# Patient Record
Sex: FEMALE | Race: WHITE | NOT HISPANIC OR LATINO | Employment: OTHER | URBAN - METROPOLITAN AREA
[De-identification: names, ages, dates, MRNs, and addresses within clinical notes are randomized per-mention and may not be internally consistent; named-entity substitution may affect disease eponyms.]

---

## 2017-01-03 ENCOUNTER — OFFICE VISIT (OUTPATIENT)
Dept: SPORTS MEDICINE | Facility: CLINIC | Age: 82
End: 2017-01-03
Payer: MEDICARE

## 2017-01-03 VITALS — WEIGHT: 187 LBS | HEIGHT: 62 IN | TEMPERATURE: 98 F | BODY MASS INDEX: 34.41 KG/M2

## 2017-01-03 DIAGNOSIS — M17.11 PRIMARY OSTEOARTHRITIS OF RIGHT KNEE: Primary | ICD-10-CM

## 2017-01-03 PROCEDURE — 99214 OFFICE O/P EST MOD 30 MIN: CPT | Mod: S$PBB,,, | Performed by: FAMILY MEDICINE

## 2017-01-03 PROCEDURE — 99213 OFFICE O/P EST LOW 20 MIN: CPT | Mod: PBBFAC,PO | Performed by: FAMILY MEDICINE

## 2017-01-03 PROCEDURE — 99999 PR PBB SHADOW E&M-EST. PATIENT-LVL III: CPT | Mod: PBBFAC,,, | Performed by: FAMILY MEDICINE

## 2017-01-03 NOTE — MR AVS SNAPSHOT
[facility]  [address]  [facility] [address]  Phone: [phone]                  Jodie Mehta   1/3/2017 2:15 PM   Appointment    Description:  Female : 1931   Provider:  Kavon Villagran MD   Department:  [facility]                To Do List           Future Appointments        Provider Department Dept Phone    1/3/2017 2:15 PM Kavon Villagran MD [facility] [phone]      Goals (5 Years of Data)     None      [facility]     [facility] Care [facility] -  Assistance  Registered nurses in the [facility] On Call Center provide clinical advisement, health education, appointment booking, and other advisory services.  Call for this free service at [phone].             Medications           Message regarding Medications     Verify the changes and/or additions to your medication regime listed below are the same as discussed with your clinician today.  If any of these changes or additions are incorrect, please notify your healthcare provider.             Verify that the below list of medications is an accurate representation of the medications you are currently taking.  If none reported, the list may be blank. If incorrect, please contact your healthcare provider. Carry this list with you in case of emergency.           Current Medications     aspirin (ECOTRIN) 81 MG EC tablet Take 81 mg by mouth once daily.      azelastine (ASTELIN) 137 mcg (0.1 %) nasal spray     budesonide-formoterol 160-4.5 mcg (SYMBICORT) 160-4.5 mcg/actuation HFAA Inhale 2 puffs into the lungs every 12 (twelve) hours.      calcium carbonate-vit D3-min (CALTRATE 600+D PLUS MINERALS) 600 mg (1,500 mg)-400 unit Chew Take by mouth. 1 Tablet, Chewable Oral Every day    calcium citrate-vitamin D3 315-200 mg (CITRACAL+D) 315-200 mg-unit per tablet Take 1 tablet by mouth 2 (two) times daily.      chlorzoxazone (PARAFON FORTE) 500 mg Tab Take 500  mg by mouth 4 (four) times daily as needed.     cyclobenzaprine (FLEXERIL) 5 MG tablet Take 1 tablet (5 mg total) by mouth nightly.    dexlansoprazole (DEXILANT) 60 mg capsule Take 60 mg by mouth once daily.     donepezil (ARICEPT) 10 MG tablet Take 10 mg by mouth every evening.      hydrochlorothiazide (HYDRODIURIL) 25 MG tablet Take by mouth. 1 Tablet Oral Every morning    memantine (NAMENDA) 10 MG Tab Take 5 mg by mouth once daily.      naproxen (EC NAPROSYN) 500 MG EC tablet Take by mouth. 1 Tablet, Delayed Release (E.C.) Oral Twice a day    potassium chloride (MICRO-K) 10 MEQ CpSR Take 10 mEq by mouth once daily.      solifenacin (VESICARE) 5 MG tablet Take 10 mg by mouth once daily.      telmisartan (MICARDIS) 80 MG Tab Take 40 mg by mouth once daily.      telmisartan-hydrochlorothiazide (MICARDIS HCT) 40-12.5 mg per tablet Take by mouth. 1 Tablet Oral Every day           Clinical Reference Information           Allergies as of 1/3/2017     Sulfa (Sulfonamide Antibiotics)      Immunizations Administered on Date of Encounter - 1/3/2017     None      [facility] Sign-Up     Activating your [facility] account is as easy as 1-2-3!     1) Visit [email or web address], select Sign Up Now, enter this activation code and your date of birth, then select Next.  [email or web address]  Expires: 1/26/2017  1:55 PM      2) Create a username and password to use when you visit [facility] in the future and select a security question in case you lose your password and select Next.    3) Enter your e-mail address and click Sign Up!    Additional Information  If you have questions, please e-mail [email or web address] or call [phone] to talk to our [facility] staff. Remember, [facility] is NOT to be used for urgent needs. For medical emergencies, dial 911.

## 2017-01-03 NOTE — PROGRESS NOTES
Subjective:          Chief Complaint: Jodie Mehta is a 85 y.o. female who had concerns including Pain of the Right Knee.    HPI  #1  Location: anterior knee, right   Onset: insidious, many years  Palliative:    Relative rest   VSI, in past, most recent 16.08, little improvement    CSI, in past, little improvement    Walker   Wheelchair   Provocative:    Ambulation   ADLs  Prior:    L knee - TKA - ~1994   Progression: worsening discomfort  Quality:    Sharp pain with activity    Ache   Radiation: none  Severity: per nursing documentation  Timing: intermittent w/ use  Trauma: none    ROS  Review of systems (ROS):  A 10+ review of systems was performed with pertinent positives and negatives noted above in the history of present illness. Other systems were negative unless otherwise specified.    Pain Related Questions  Over the past 3 days, what was your average pain during activity? (I.e. running, jogging, walking, climbing stairs, getting dressed, ect.): 8  Over the past 3 days, what was your highest pain level?: 8  Over the past 3 days, what was your lowest pain level? : 2    Other  How many nights a week are you awakened by your affected body part?: 0  Was the patient's HEIGHT measured or patient reported?: Patient Reported  Was the patient's WEIGHT measured or patient reported?: Patient Reported      Objective:        General: Jodie is well-developed, well-nourished, appears stated age, in no acute distress, alert and oriented to time, place and person.     Ortho/SPM Exam  Constitutional:     -Vital signs: height, weight, and temperature: reviewed     -General appearance: no apparent distress  Dermatologic/skin:     -Inspection: No acute lesions, ulceration, or induration of the skin noted about   the area evaluated   -Palpation: No subcutaneous crepitus noted about the area evaluated  Musculoskeletal:   Observation: There is no edema, erythema, or ecchymoses about the knee  Gait: ANTALGIC  Standing  alignment: No pathologic varus or valgus deformity  Feet: No pathologic pes planus or pes cavus   Squatting: unable to perform     No recurvatum of LE noted  No joint effusion noted  POS tenderness with lateral or medial patellar mobility  No tenderness with palpation of lateral or medial patellar facet joints  No tenderness with palpation of distal quadriceps  No tenderness with palpation of quadriceps tendon   POS tenderness with palpation of patellar tendon  No tenderness with palpation of popliteal fossa, no popliteal cyst noted  No pain or laxity with passive varus stressing of the knee  No pain or laxity with passive valgus stressing of the knee  Lachman's test has firm endpoint  Passive knee flexion DOES elicit pain past 110 degrees  POS tenderness with palpation of tibial tuberosity, anserine bursa, or Gerdy's tubercle  POS tenderness with medial joint line palpation  No tenderness with palpation of medial collateral ligament (MCL)  POS tenderness with lateral joint line palpation  No tenderness with palpation of lateral collateral ligament (LCL)  Posterior drawer test has firm endpoint  Ana's test DOES elicit pain, though no clunk    Hip examination:  ROM appropriate.  No pain elicited with passive ROM.        Cardiovascular:   -Examined extremity is warm and well perfused   Neurologic:   -Examined extremitys sensation is appropriate     AAFP/FPM: Pocket Guide Documentation Guidelines, (c)2014    (80619=2+ systems/areas or 12+ bullets (8 MSK)   04855=6+ systems/areas or 18+ bullets (12 MSK))        Assessment:       Encounter Diagnosis   Name Primary?   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, yoel med compartment, right   S/p L knee TKA ~ 1994  #2 Alzheimer's dementia    No evidence of neurologic pathology  No evidence of vascular pathology    Imaging studies reviewed:   X-ray knee bilateral 16.08    Plan:    We  discussed the importance of appropriate diet, weight, and regular exercise including quadriceps strengthening     We discussed options including:  #1 watchful waiting  #2 physical therapy aimed at:   quadriceps strengthening   gait training   #3 injection therapy:   CSI iaknee    Right, worked poorly, repeat   VSI iaknee    Right, worked poorly, repeat   Orthobiologics     PRP    SCI      Question of ability to lie still for SC harvest  #4 consultation re: TKA   Poor surgical candidate      The patient and her  chooses #3 PRP iaknee right    Pain management: handout given  Bracing:    Wheelchair, prn  Physical therapy:   Activity (e.g. sports, work) restrictions: as tolerated   school/vocation:     Follow up for PRP procedure  Ineffective-->BMAC  Should symptoms worsen or fail to resolve, consider:  Revisiting the above options      Patient questionnaires may have been collected.

## 2017-01-05 ENCOUNTER — OFFICE VISIT (OUTPATIENT)
Dept: SPORTS MEDICINE | Facility: CLINIC | Age: 82
End: 2017-01-05

## 2017-01-05 VITALS — BODY MASS INDEX: 34.41 KG/M2 | WEIGHT: 187 LBS | TEMPERATURE: 98 F | HEIGHT: 62 IN

## 2017-01-05 DIAGNOSIS — M25.561 CHRONIC PAIN OF RIGHT KNEE: Primary | ICD-10-CM

## 2017-01-05 DIAGNOSIS — M17.11 PRIMARY OSTEOARTHRITIS OF RIGHT KNEE: ICD-10-CM

## 2017-01-05 DIAGNOSIS — G89.29 CHRONIC PAIN OF RIGHT KNEE: Primary | ICD-10-CM

## 2017-01-05 PROCEDURE — 0232T NJX PLATELET PLASMA: CPT | Mod: S$GLB,,, | Performed by: FAMILY MEDICINE

## 2017-01-05 PROCEDURE — 99999 PR PBB SHADOW E&M-EST. PATIENT-LVL III: CPT | Mod: PBBFAC,,, | Performed by: FAMILY MEDICINE

## 2017-01-05 PROCEDURE — 99499 UNLISTED E&M SERVICE: CPT | Mod: CSM,S$GLB,, | Performed by: FAMILY MEDICINE

## 2017-01-05 NOTE — MR AVS SNAPSHOT
[facility]  [address]  [facility] [address]  Phone: [phone]                  Jodie Mehta   2017 1:30 PM   Appointment    Description:  Female : 1931   Provider:  Kavon Villagran MD   Department:  [facility]                To Do List           Future Appointments        Provider Department Dept Phone    2017 1:30 PM Kavon Vilalgran MD [facility] [phone]      Goals (5 Years of Data)     None      [facility]     [facility] Nurse Care Line -  Assistance  Registered nurses in the [facility] On Call Center provide clinical advisement, health education, appointment booking, and other advisory services.  Call for this free service at [phone].             Medications           Message regarding Medications     Verify the changes and/or additions to your medication regime listed below are the same as discussed with your clinician today.  If any of these changes or additions are incorrect, please notify your healthcare provider.             Verify that the below list of medications is an accurate representation of the medications you are currently taking.  If none reported, the list may be blank. If incorrect, please contact your healthcare provider. Carry this list with you in case of emergency.           Current Medications     aspirin (ECOTRIN) 81 MG EC tablet Take 81 mg by mouth once daily.      azelastine (ASTELIN) 137 mcg (0.1 %) nasal spray     budesonide-formoterol 160-4.5 mcg (SYMBICORT) 160-4.5 mcg/actuation HFAA Inhale 2 puffs into the lungs every 12 (twelve) hours.      calcium carbonate-vit D3-min (CALTRATE 600+D PLUS MINERALS) 600 mg (1,500 mg)-400 unit Chew Take by mouth. 1 Tablet, Chewable Oral Every day    calcium citrate-vitamin D3 315-200 mg (CITRACAL+D) 315-200 mg-unit per tablet Take 1 tablet by mouth 2 (two) times daily.      chlorzoxazone (PARAFON FORTE) 500 mg Tab Take 500  mg by mouth 4 (four) times daily as needed.     cyclobenzaprine (FLEXERIL) 5 MG tablet Take 1 tablet (5 mg total) by mouth nightly.    dexlansoprazole (DEXILANT) 60 mg capsule Take 60 mg by mouth once daily.     donepezil (ARICEPT) 10 MG tablet Take 10 mg by mouth every evening.      hydrochlorothiazide (HYDRODIURIL) 25 MG tablet Take by mouth. 1 Tablet Oral Every morning    memantine (NAMENDA) 10 MG Tab Take 5 mg by mouth once daily.      naproxen (EC NAPROSYN) 500 MG EC tablet Take by mouth. 1 Tablet, Delayed Release (E.C.) Oral Twice a day    potassium chloride (MICRO-K) 10 MEQ CpSR Take 10 mEq by mouth once daily.      solifenacin (VESICARE) 5 MG tablet Take 10 mg by mouth once daily.      telmisartan (MICARDIS) 80 MG Tab Take 40 mg by mouth once daily.      telmisartan-hydrochlorothiazide (MICARDIS HCT) 40-12.5 mg per tablet Take by mouth. 1 Tablet Oral Every day           Clinical Reference Information           Allergies as of 1/5/2017     Sulfa (Sulfonamide Antibiotics)      Immunizations Administered on Date of Encounter - 1/5/2017     None      [facility] Sign-Up     Activating your [facility] account is as easy as 1-2-3!     1) Visit [email or web address], select Sign Up Now, enter this activation code and your date of birth, then select Next.  [email or web address]  Expires: 1/26/2017  1:55 PM      2) Create a username and password to use when you visit [facility] in the future and select a security question in case you lose your password and select Next.    3) Enter your e-mail address and click Sign Up!    Additional Information  If you have questions, please e-mail [email or web address] or call [phone] to talk to our [facility] staff. Remember, [facility] is NOT to be used for urgent needs. For medical emergencies, dial 911.

## 2017-01-09 NOTE — PROGRESS NOTES
Platelet rich plasma (PRP) injection under ultrasound guidance  0232T     Name of procedure:    ultrasound-guided injection of platelet rich plasma (PRP), intra articular knee joint  Indication(s):    pain, osteoarthritis of knee, medial and lateral meniscal tearing, knee instability  Patient consent:    The indications, risks, and alternatives to the procedure were explained to the patient.  The  patient was given the opportunity to ask questions.  The the patient consented to the  procedure.    Type of anesthesia used: local   lidocaine: no   cold spray: yes    Description of injection procedure:    The procedure used standard sterile preparation, local anesthesia, and patient positioning.  A surgical time out was performed, including verification of patient ID, procedure, site and side, availability of information and equipment, review of safety issues, and agreement with consent.  The exact position of the area to be injected was identified using 1) physical examination and 2) ultrasound imaging.  The injection site was marked and then cleaned using aseptic technique.  Under ultrasound guidance, the needle was advanced to the site to be injected, and the injectate was deposited at the specific site.    [Location:   Site:   Needle size:   Approach: ]  Sterile technique was used to extract 54 mL of blood from a peripheral vein.  This blood was  into density-divided layers using an Drone[employer] Kingsley centrifuge.  The layer of platelet rich plasma (PRP) was placed into a sterile syringe.  Using sterile technique, a therapeutic dose of PRP was injected into the supero lateral aspect of the suprapatellar bursa of the knee using a 20g needle.  Complications: none  Estimated blood loss from injection procedure: none  Disposition: The patient tolerated the procedure well and there were no immediate complications or adverse reactions to the injectate.  The patient was instructed to call the clinic immediately for any  mild to moderate adverse side effects, or to call 911 in the event of an emergency.    Homegoing care: In case of injection-site soreness, the patient was instructed to apply ice to the joint in 20 minutes on/20 minutes off intervals.  Strenuous activities should be avoided for 24 hours following the injection.  The patient was warned of possible blood sugar and/or blood pressure changes after the procedure.  Using the rate of pain resolution as a guide, the patient can resume use of the injected extremity as tolerated.        Description of ultrasound utilization for needle guidance:   Ultrasound guidance used for needle localization.  Images saved and stored for documentation.  The medial and lateral knee compartments were visualized with significant degenerative and inflammatory changes.  Dynamic visualization of the 20g x 1.5 needle was continuous throughout the procedure.    References:  US-guided injection template of LATESHA Funk M.D. from 11/18/15  US-guided injection EMR (likely Epic-based) template of Zack Michael from 11/11/15  Epic-based, quick procedure note of AWG-performed injection on 11/23/15

## 2017-01-23 ENCOUNTER — TELEPHONE (OUTPATIENT)
Dept: SPORTS MEDICINE | Facility: CLINIC | Age: 82
End: 2017-01-23

## 2017-01-23 NOTE — TELEPHONE ENCOUNTER
----- Message from Elicia Townsend sent at 1/23/2017  1:05 PM CST -----  Contact: self@ home   pt is calling to schedule a appt for the F/U to a steam cell appt.

## 2017-01-27 ENCOUNTER — OFFICE VISIT (OUTPATIENT)
Dept: SPORTS MEDICINE | Facility: CLINIC | Age: 82
End: 2017-01-27
Payer: MEDICARE

## 2017-01-27 VITALS — BODY MASS INDEX: 34.41 KG/M2 | TEMPERATURE: 98 F | WEIGHT: 187 LBS | HEIGHT: 62 IN

## 2017-01-27 DIAGNOSIS — M17.11 PRIMARY OSTEOARTHRITIS OF RIGHT KNEE: Primary | ICD-10-CM

## 2017-01-27 PROCEDURE — 99999 PR PBB SHADOW E&M-EST. PATIENT-LVL III: CPT | Mod: PBBFAC,,, | Performed by: FAMILY MEDICINE

## 2017-01-27 PROCEDURE — 99213 OFFICE O/P EST LOW 20 MIN: CPT | Mod: PBBFAC,PO | Performed by: FAMILY MEDICINE

## 2017-01-27 PROCEDURE — 99214 OFFICE O/P EST MOD 30 MIN: CPT | Mod: S$PBB,,, | Performed by: FAMILY MEDICINE

## 2017-01-27 NOTE — MR AVS SNAPSHOT
[facility]  [address]  [facility] [address]  Phone: [phone]                  Jodie Mehta   2017 12:45 PM   Appointment    Description:  Female : 1931   Provider:  Kavon Villagran MD   Department:  [facility]                To Do List           Future Appointments        Provider Department Dept Phone    2017 12:45 PM Kavon Villagran MD [facility] [phone]      Goals (5 Years of Data)     None      [facility]     [facility] Nurse Care Line -  Assistance  Registered nurses in the [facility] On Call Center provide clinical advisement, health education, appointment booking, and other advisory services.  Call for this free service at [phone].             Medications           Message regarding Medications     Verify the changes and/or additions to your medication regime listed below are the same as discussed with your clinician today.  If any of these changes or additions are incorrect, please notify your healthcare provider.             Verify that the below list of medications is an accurate representation of the medications you are currently taking.  If none reported, the list may be blank. If incorrect, please contact your healthcare provider. Carry this list with you in case of emergency.           Current Medications     aspirin (ECOTRIN) 81 MG EC tablet Take 81 mg by mouth once daily.      azelastine (ASTELIN) 137 mcg (0.1 %) nasal spray     budesonide-formoterol 160-4.5 mcg (SYMBICORT) 160-4.5 mcg/actuation HFAA Inhale 2 puffs into the lungs every 12 (twelve) hours.      calcium carbonate-vit D3-min (CALTRATE 600+D PLUS MINERALS) 600 mg (1,500 mg)-400 unit Chew Take by mouth. 1 Tablet, Chewable Oral Every day    calcium citrate-vitamin D3 315-200 mg (CITRACAL+D) 315-200 mg-unit per tablet Take 1 tablet by mouth 2 (two) times daily.      chlorzoxazone (PARAFON FORTE) 500 mg Tab Take  500 mg by mouth 4 (four) times daily as needed.     cyclobenzaprine (FLEXERIL) 5 MG tablet Take 1 tablet (5 mg total) by mouth nightly.    dexlansoprazole (DEXILANT) 60 mg capsule Take 60 mg by mouth once daily.     donepezil (ARICEPT) 10 MG tablet Take 10 mg by mouth every evening.      hydrochlorothiazide (HYDRODIURIL) 25 MG tablet Take by mouth. 1 Tablet Oral Every morning    memantine (NAMENDA) 10 MG Tab Take 5 mg by mouth once daily.      naproxen (EC NAPROSYN) 500 MG EC tablet Take by mouth. 1 Tablet, Delayed Release (E.C.) Oral Twice a day    potassium chloride (MICRO-K) 10 MEQ CpSR Take 10 mEq by mouth once daily.      solifenacin (VESICARE) 5 MG tablet Take 10 mg by mouth once daily.      telmisartan (MICARDIS) 80 MG Tab Take 40 mg by mouth once daily.      telmisartan-hydrochlorothiazide (MICARDIS HCT) 40-12.5 mg per tablet Take by mouth. 1 Tablet Oral Every day           Clinical Reference Information           Allergies as of 2017     Sulfa (Sulfonamide Antibiotics)      Immunizations Administered on Date of Encounter - 2017     None      [facility] Sign-Up     Activating your [facility] account is as easy as 1-2-3!     1) Visit [email or web address], select Sign Up Now, enter this activation code and your date of birth, then select Next.  [email or web address]  Expires: 3/13/2017 12:21 PM      2) Create a username and password to use when you visit [facility] in the future and select a security question in case you lose your password and select Next.    3) Enter your e-mail address and click Sign Up!    Additional Information  If you have questions, please e-mail [email or web address] or call [phone] to talk to our [facility] staff. Remember, [facility] is NOT to be used for urgent needs. For medical emergencies, dial 911.

## 2017-01-27 NOTE — PROGRESS NOTES
"Subjective:          Chief Complaint: Jodie Mehta is a 85 y.o. female who had concerns including Follow-up of the Right Knee.    HPI  #1  Location: anterior knee, right   Onset: insidious, many years  Palliative:    Relative rest   VSI, in past, most recent 16.08, little improvement    CSI, in past, little improvement    Walker   Wheelchair    PRP, Randy, 01/05/17, ~25% improvement ("more good days recently")  Provocative:    Ambulation   ADLs  Prior:    L knee - TKA - ~1994   Progression: slowly resolving discomfort  Quality:    Sharp pain with activity    Ache    Soreness in evening    Radiation: none  Severity: per nursing documentation  Timing: intermittent w/ use  Trauma: none    ROS  Review of systems (ROS):  A 10+ review of systems was performed with pertinent positives and negatives noted above in the history of present illness. Other systems were negative unless otherwise specified.    Pain Related Questions  Over the past 3 days, what was your average pain during activity? (I.e. running, jogging, walking, climbing stairs, getting dressed, ect.): 5  Over the past 3 days, what was your highest pain level?: 9  Over the past 3 days, what was your lowest pain level? : 3    Other  How many nights a week are you awakened by your affected body part?: 0  Was the patient's HEIGHT measured or patient reported?: Patient Reported  Was the patient's WEIGHT measured or patient reported?: Measured      Objective:        General: Jodie is well-developed, well-nourished, appears stated age, in no acute distress, alert and oriented to time, place and person.     Ortho/SPM Exam  Constitutional:     -Vital signs: height, weight, and temperature: reviewed     -General appearance: no apparent distress  Dermatologic/skin:     -Inspection: No acute lesions, ulceration, or induration of the skin noted about   the area evaluated   -Palpation: No subcutaneous crepitus noted about the area evaluated  Musculoskeletal: "   Observation: There is no edema, erythema, or ecchymoses about the knee  Gait: ANTALGIC  Standing alignment: No pathologic varus or valgus deformity  Feet: No pathologic pes planus or pes cavus   Squatting: unable to perform     No recurvatum of LE noted  No joint effusion noted  POS tenderness with lateral or medial patellar mobility  No tenderness with palpation of lateral or medial patellar facet joints  No tenderness with palpation of distal quadriceps  No tenderness with palpation of quadriceps tendon   POS tenderness with palpation of patellar tendon  No tenderness with palpation of popliteal fossa, no popliteal cyst noted  No pain or laxity with passive varus stressing of the knee  No pain or laxity with passive valgus stressing of the knee  Lachman's test has firm endpoint  Passive knee flexion DOES elicit pain past 110 degrees  POS tenderness with palpation of tibial tuberosity, anserine bursa, or Gerdy's tubercle  POS tenderness with medial joint line palpation  No tenderness with palpation of medial collateral ligament (MCL)  POS tenderness with lateral joint line palpation  No tenderness with palpation of lateral collateral ligament (LCL)  Posterior drawer test has firm endpoint  Ana's test DOES elicit pain, though no clunk    Hip examination:  ROM appropriate.  No pain elicited with passive ROM.        Cardiovascular:   -Examined extremity is warm and well perfused   Neurologic:   -Examined extremitys sensation is appropriate     AAFP/FPM: Pocket Guide Documentation Guidelines, (c)2014    (91569=5+ systems/areas or 12+ bullets (8 MSK)   75677=9+ systems/areas or 18+ bullets (12 MSK))      Assessment:       Encounter Diagnosis   Name Primary?   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, yoel med compartment, right   S/p knee TKA ~ 1994, left  #2 Alzheimer's dementia    No evidence of neurologic pathology  No  evidence of vascular pathology    Imaging studies reviewed:   X-ray knee bilateral 16.08    Plan:    We discussed the importance of appropriate diet, weight, and regular exercise including quadriceps strengthening     We discussed options including:  #1 watchful waiting  #2 physical therapy aimed at:   quadriceps strengthening   gait training   #3 injection therapy:   CSI iaknee    Right, worked poorly, repeat   VSI iaknee    Right, worked poorly, repeat   PRP    Right: worked ~25%, repeat     SCI     Right,   #4 consultation re: TKA   Poor surgical candidate      The patient and her  chooses #3 SCI iaknee right    Pain management: handout given  Bracing:    Wheelchair, prn  Physical therapy:   Activity (e.g. sports, work) restrictions: as tolerated   school/vocation:     Follow up for BMAC  Should symptoms worsen or fail to resolve, consider:  Revisiting the above options      Patient questionnaires may have been collected.

## 2017-01-30 DIAGNOSIS — M17.11 PRIMARY OSTEOARTHRITIS OF RIGHT KNEE: Primary | ICD-10-CM

## 2017-01-31 RX ORDER — DIAZEPAM 10 MG/1
10 TABLET ORAL ONCE
Qty: 1 TABLET | Refills: 0 | Status: SHIPPED | OUTPATIENT
Start: 2017-01-31 | End: 2017-01-31

## 2017-02-02 ENCOUNTER — OFFICE VISIT (OUTPATIENT)
Dept: SPORTS MEDICINE | Facility: CLINIC | Age: 82
End: 2017-02-02

## 2017-02-02 VITALS — HEIGHT: 62 IN | BODY MASS INDEX: 34.41 KG/M2 | WEIGHT: 187 LBS | TEMPERATURE: 98 F

## 2017-02-02 DIAGNOSIS — G89.29 CHRONIC PAIN OF RIGHT KNEE: Primary | ICD-10-CM

## 2017-02-02 DIAGNOSIS — M25.561 CHRONIC PAIN OF RIGHT KNEE: Primary | ICD-10-CM

## 2017-02-02 DIAGNOSIS — M17.11 PRIMARY OSTEOARTHRITIS OF RIGHT KNEE: ICD-10-CM

## 2017-02-02 PROCEDURE — 99499 UNLISTED E&M SERVICE: CPT | Mod: CSM,,, | Performed by: FAMILY MEDICINE

## 2017-02-02 PROCEDURE — 99999 PR PBB SHADOW E&M-EST. PATIENT-LVL III: CPT | Mod: PBBFAC,,, | Performed by: FAMILY MEDICINE

## 2017-02-02 PROCEDURE — 27599 UNLISTED PX FEMUR/KNEE: CPT | Mod: CSM,,, | Performed by: FAMILY MEDICINE

## 2017-02-02 RX ORDER — OXYCODONE HYDROCHLORIDE 5 MG/1
5 TABLET ORAL EVERY 4 HOURS PRN
Qty: 20 TABLET | Refills: 0 | Status: SHIPPED | OUTPATIENT
Start: 2017-02-02 | End: 2018-10-30

## 2017-02-02 NOTE — MR AVS SNAPSHOT
[facility]  [address]  [facility] [address]  Phone: [phone]                  Jodie Mehta   2017 2:00 PM   Appointment    Description:  Female : 1931   Provider:  Kavon Villagran MD   Department:  [facility]                To Do List           Future Appointments        Provider Department Dept Phone    2017 2:00 PM Kavon Villagran MD [facility] [phone]      Goals (5 Years of Data)     None      [facility]     [facility] Care [facility] -  Assistance  Registered nurses in the [facility] Center provide clinical advisement, health education, appointment booking, and other advisory services.  Call for this free service at [phone].             Medications           Message regarding Medications     Verify the changes and/or additions to your medication regime listed below are the same as discussed with your clinician today.  If any of these changes or additions are incorrect, please notify your healthcare provider.             Verify that the below list of medications is an accurate representation of the medications you are currently taking.  If none reported, the list may be blank. If incorrect, please contact your healthcare provider. Carry this list with you in case of emergency.           Current Medications     aspirin (ECOTRIN) 81 MG EC tablet Take 81 mg by mouth once daily.      azelastine (ASTELIN) 137 mcg (0.1 %) nasal spray     budesonide-formoterol 160-4.5 mcg (SYMBICORT) 160-4.5 mcg/actuation HFAA Inhale 2 puffs into the lungs every 12 (twelve) hours.      calcium carbonate-vit D3-min (CALTRATE 600+D PLUS MINERALS) 600 mg (1,500 mg)-400 unit Chew Take by mouth. 1 Tablet, Chewable Oral Every day    calcium citrate-vitamin D3 315-200 mg (CITRACAL+D) 315-200 mg-unit per tablet Take 1 tablet by mouth 2 (two) times daily.      chlorzoxazone (PARAFON FORTE) 500 mg Tab Take 500  mg by mouth 4 (four) times daily as needed.     cyclobenzaprine (FLEXERIL) 5 MG tablet Take 1 tablet (5 mg total) by mouth nightly.    dexlansoprazole (DEXILANT) 60 mg capsule Take 60 mg by mouth once daily.     donepezil (ARICEPT) 10 MG tablet Take 10 mg by mouth every evening.      hydrochlorothiazide (HYDRODIURIL) 25 MG tablet Take by mouth. 1 Tablet Oral Every morning    memantine (NAMENDA) 10 MG Tab Take 5 mg by mouth 2 (two) times daily.     naproxen (EC NAPROSYN) 500 MG EC tablet Take by mouth. 1 Tablet, Delayed Release (E.C.) Oral Twice a day    potassium chloride (MICRO-K) 10 MEQ CpSR Take 10 mEq by mouth once daily.      solifenacin (VESICARE) 5 MG tablet Take 10 mg by mouth once daily.      telmisartan (MICARDIS) 80 MG Tab Take 40 mg by mouth once daily.      telmisartan-hydrochlorothiazide (MICARDIS HCT) 40-12.5 mg per tablet Take by mouth. 1 Tablet Oral Every day           Clinical Reference Information           Allergies as of 2017     Sulfa (Sulfonamide Antibiotics)      Immunizations Administered on Date of Encounter - 2017     None      [facility] Sign-Up     Activating your [facility] account is as easy as 1-2-3!     1) Visit [email or web address], select Sign Up Now, enter this activation code and your date of birth, then select Next.  [email or web address]  Expires: 3/13/2017 12:21 PM      2) Create a username and password to use when you visit [facility] in the future and select a security question in case you lose your password and select Next.    3) Enter your e-mail address and click Sign Up!    Additional Information  If you have questions, please e-mail [email or web address] or call [phone] to talk to our [facility] staff. Remember, [facility] is NOT to be used for urgent needs. For medical emergencies, dial 911.

## 2017-02-02 NOTE — PROGRESS NOTES
Stem cell injection   with bone marrow aspirate concentrate (BMAC) injection   performed under musculoskeletal ultrasound guidance   intra articular injection of the right knee joint    Indication(s):    1) medial compartment knee pain   2) Kellgren-Deion Grade IV osteoarthritis of knee, especially the medial compartment  Patient consent:    The indications, risks, and alternatives to the procedure were explained to the patient.  The patient was given the opportunity to ask questions.  The the patient consented to the procedure(s).  Written/signed consent has been scanned into the electronic medical record.  Type of anesthesia used: local   Lidocaine: for bone marrow aspiration    Cold spray/vapocoolant: for intra articular injection +/- joint aspiration    A surgical time out was performed, including verification of patient identification, procedure(s) to be performed, procedure site(s) and side(s), availability of information and equipment, review of safety issues, and agreement with consent.      Description of bone marrow harvest and injection procedure(s):    The procedure used standard sterile preparation, local anesthesia, and patient positioning.  The exact position of the area to be aspirated (the posterior iliac crest/PSIS) was identified using 1) physical examination and 2) ultrasound imaging.  The aspiration site was marked and then cleaned using aseptic technique.  The trocar was advanced through soft tissues to the site of the posterior superior iliac spine on the posterior iliac crest.  The trochar was then slowly advanced through the osseous cortex.  When through, bone marrow was aspirated through the trochar to a volume of 89 cc's.  The aspirate was  into density-divided layers in an [employer] centrifuge and the stem cells (SC) and platelet poor plasma (PPP) were placed in separate syringes to be used during the injection procedure(s).     The exact position of the area(s) to be  injected was then identified using 1) physical examination and 2) ultrasound imaging.  The injection site(s) was marked and then cleaned using aseptic technique.  Under ultrasound guidance, the 20g x 1.5 needle was advanced to into superior lateral aspect of the joint space.     After arthro-aspiration of 0 cc synovial fluid, into the joint was injected:   2 cc stem cells drawn up to 5 cc SC + PPP solution    1 cc / 40 mg triamcinolone acetonide    Complications: none  Estimated blood loss from injection procedure: none  Disposition: The patient tolerated the procedure well and there were no immediate complications or adverse reactions to the aspiration nor the injectate.  The patient was instructed to call the clinic immediately for any mild to moderate adverse side effects, or to call 911 in the event of an emergency.      Homegoing care: In case of injection-site soreness, the patient was instructed to apply ice to the joint in 20 minutes on/20 minutes off intervals.  Strenuous activities should be avoided for 7 days following the procedure.  The patient was warned of possible blood sugar and/or blood pressure changes after the procedure.  Using the rate of pain resolution as a guide, the patient can resume use of the injected joint as tolerated.      Description of ultrasound utilization for needle guidance:   Ultrasound guidance used for needle localization.  Images were saved and stored for documentation.  The knee joint joint was visualized with significant joint space narrowing and subsequent inflammatory changes.  Dynamic visualization of the 20g x 1.5 needle was continuous throughout the procedure(s).    References:  Technique refined from:  https://www.arthrex.com/resources/video/9W2t-2xcRK2hfDSVYu4nQZ/posterior-iliac-crest-[email or web address]-aspiration-prone-position  and   https://www.arthrex.com/resources/video/02ZDTWD_cUyTXgFJhVgZrg/bone-marrow-aspirate-harvesting-technique    Procedure note based  on:  US-guided injection template of LATESHA Funk M.D. from 11/18/15  US-guided injection EMR (likely Epic-based) template of Zack Michael from 11/11/15  Epic-based, quick procedure note of AWG-performed injection on 11/23/15    Procedure refinement and documentation templates last reviewed 16.10.20

## 2017-11-14 ENCOUNTER — OFFICE VISIT (OUTPATIENT)
Dept: SPORTS MEDICINE | Facility: CLINIC | Age: 82
End: 2017-11-14
Payer: MEDICARE

## 2017-11-14 ENCOUNTER — HOSPITAL ENCOUNTER (OUTPATIENT)
Dept: RADIOLOGY | Facility: HOSPITAL | Age: 82
Discharge: HOME | End: 2017-11-14
Attending: FAMILY MEDICINE
Payer: MEDICARE

## 2017-11-14 VITALS — WEIGHT: 186.94 LBS | BODY MASS INDEX: 34.4 KG/M2 | HEIGHT: 62 IN | TEMPERATURE: 98 F

## 2017-11-14 DIAGNOSIS — M25.569 KNEE PAIN, UNSPECIFIED CHRONICITY, UNSPECIFIED LATERALITY: ICD-10-CM

## 2017-11-14 DIAGNOSIS — G89.29 CHRONIC PAIN OF RIGHT KNEE: Primary | ICD-10-CM

## 2017-11-14 DIAGNOSIS — M25.561 CHRONIC PAIN OF RIGHT KNEE: Primary | ICD-10-CM

## 2017-11-14 DIAGNOSIS — M17.11 PRIMARY OSTEOARTHRITIS OF RIGHT KNEE: ICD-10-CM

## 2017-11-14 PROCEDURE — 73564 X-RAY EXAM KNEE 4 OR MORE: CPT | Mod: TC,50,PO

## 2017-11-14 PROCEDURE — 73564 X-RAY EXAM KNEE 4 OR MORE: CPT | Mod: 26,50,, | Performed by: RADIOLOGY

## 2017-11-14 PROCEDURE — 99999 PR PBB SHADOW E&M-EST. PATIENT-LVL III: CPT | Mod: PBBFAC,,, | Performed by: FAMILY MEDICINE

## 2017-11-14 PROCEDURE — 99214 OFFICE O/P EST MOD 30 MIN: CPT | Mod: S$PBB,,, | Performed by: FAMILY MEDICINE

## 2017-11-14 PROCEDURE — 99213 OFFICE O/P EST LOW 20 MIN: CPT | Mod: PBBFAC,25,PO | Performed by: FAMILY MEDICINE

## 2017-11-14 NOTE — PROGRESS NOTES
Jodie Mehta, a 86 y.o. female, presents today for evaluation of her right knee.      HISTORY OF PRESENT ILLNESS   Location: ant knee, right  Onset: insidious, chronic  Palliative:    Relative rest   Oral analgesics     Provocative:    ADLs   ambulation  Prior: none  Progression: plateau discomfort  Quality:    Sharp pain  Radiation: none  Severity: per nursing documentation  Timing: intermittent w/ use  Trauma: none    Review of systems (ROS):  A 10+ review of systems was performed with pertinent positives and negatives noted above in the history of present illness. Other systems were negative unless otherwise specified.    PHYSICAL EXAMINATION  General:  The patient is alert and oriented x 3. Mood is pleasant. Observation of ears, eyes and nose reveal no gross abnormalities. HEENT: NCAT, sclera anicteric.   Lungs: Respirations are equal and unlabored.  Gait is coordinated. Patient can toe walk and heel walk without difficulty.    right KNEE EXAMINATION    Observation/Inspection  Gait:   Nonantalgic   Alignment:  Neutral   Scars:   None   Muscle atrophy: Mild  Effusion:  None   Warmth:  None   Discoloration:   none     Tenderness / Crepitus (T / C):         T / C      T / C  Patella   - / -   Lateral joint line   - / -     Peripatellar medial  -  Medial joint line    + / -  Peripatellar lateral -  Medial plica   - / -  Patellar tendon -   Popliteal fossa   - / -  Quad tendon   -   Gastrocnemius   -  Prepatellar Bursa - / -   Quadricep   -  Tibial tubercle  -  Thigh/hamstring  -  Pes anserine/HS -  Fibula    -  ITB   - / -  Tibia     -  Tib/fib joint  - / -  LCL    -    MFC   - / -   MCL: Proximal  -    LFC   - / -   Distal    -          ROM: (* = pain)  PASSIVE   ACTIVE    Left :   5 / 0 / 145   5 / 0 / 145     Right :    5 / 0 / 145   5 / 0 / 145    Patellofemoral examination:  See above noted areas of tenderness.   Patella position    Subluxation / dislocation: Centered        Sup. / Inf;   Normal    Crepitus (PF):    Absent   Patellar Mobility:       Medial-lateral:   Normal    Superior-inferior:  Normal    Inferior tilt   Normal    Patellar tendon:  Normal   Lateral tilt:    Normal   J-sign:     None   Patellofemoral grind:   No pain       Meniscal Signs:     Pain on terminal extension:  +  Pain on terminal flexion:  +  Anas maneuver:  +*  Squat     NT    Ligament Examination:  ACL / Lachman:  WNL  PCL-Post.  drawer: normal 0 to 2mm  MCL- Valgus:  normal 0 to 2mm  LCL- Varus:    normal 0 to 2mm  Pivot shift:  guarding   Dial Test:   difference c/w other side   At 30° flexion: normal (< 5°)    At 90° flexion: normal (< 5°)   Reverse Pivot Shift:   normal (Equal)     Strength: (* = with pain) Painful Side  Quadriceps   5/5  Hamstrin/5    Extremity Neuro-vascular Examination:   Sensation:  Grossly intact to light touch all dermatomal regions.   Motor Function:  Fully intact motor function at hip, knee, foot and ankle    DTRs;  quadriceps and  achilles 2+.  No clonus and downgoing Babinski.    Vascular status:  DP and PT pulses 2+, brisk capillary refill, symmetric.     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, yoel med compartment, right  #2 s/p TKA, right    No evidence of neurologic pathology  No evidence of vascular pathology    Imaging studies reviewed:   X-ray knee, bilateral 17.11    Plan:    We discussed the importance of appropriate diet, weight, and regular exercise including quadriceps strengthening     We discussed options including:  #1 watchful waiting  #2 physical therapy aimed at:   Core stability   RoM knee   Strengthening quadriceps   Gait training   #3 injection therapy:   CSI iaknee     Right,    VSI iaknee    Right,    Orthobiologics     PRPi iaknee     Right    SCi iaknee     Right   #4 consultation re: cooleif procedure     The patient chooses #4    Pain management: handout given  Bracing: wheelchair, continue  prn  Physical therapy:   Activity (e.g. 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

## 2018-01-15 ENCOUNTER — INITIAL CONSULT (OUTPATIENT)
Dept: PHYSICAL MEDICINE AND REHAB | Facility: CLINIC | Age: 83
End: 2018-01-15
Payer: MEDICARE

## 2018-01-15 VITALS
HEIGHT: 62 IN | SYSTOLIC BLOOD PRESSURE: 106 MMHG | BODY MASS INDEX: 34.23 KG/M2 | HEART RATE: 67 BPM | WEIGHT: 186 LBS | DIASTOLIC BLOOD PRESSURE: 57 MMHG

## 2018-01-15 DIAGNOSIS — G89.29 CHRONIC PAIN OF RIGHT KNEE: Primary | ICD-10-CM

## 2018-01-15 DIAGNOSIS — M17.11 PRIMARY OSTEOARTHRITIS OF RIGHT KNEE: ICD-10-CM

## 2018-01-15 DIAGNOSIS — M25.561 CHRONIC PAIN OF RIGHT KNEE: Primary | ICD-10-CM

## 2018-01-15 PROCEDURE — 99204 OFFICE O/P NEW MOD 45 MIN: CPT | Mod: S$PBB,,, | Performed by: PHYSICAL MEDICINE & REHABILITATION

## 2018-01-15 PROCEDURE — 99213 OFFICE O/P EST LOW 20 MIN: CPT | Mod: PBBFAC,PN | Performed by: PHYSICAL MEDICINE & REHABILITATION

## 2018-01-15 PROCEDURE — 99999 PR PBB SHADOW E&M-EST. PATIENT-LVL III: CPT | Mod: PBBFAC,,, | Performed by: PHYSICAL MEDICINE & REHABILITATION

## 2018-01-15 NOTE — LETTER
January 15, 2018      Kavon Villagran MD  [address]  [address]  [facility] [address]           [facility]  [address] [place] [address]  Phone: [phone]  Fax: [phone]          Patient: Jodie Mehta   MR Number: [id number]   YOB: 1931   Date of Visit: 1/15/2018       Dear Dr. Kavon Villagran:    Thank you for referring Jodie Mehta to me for evaluation. Attached you will find relevant portions of my assessment and plan of care.    If you have questions, please do not hesitate to call me. I look forward to following Jodie Mehta along with you.    Sincerely,    Jerson Delgado MD    Enclosure  CC:  No Recipients    If you would like to receive this communication electronically, please contact [email or web address] or [phone] to request more information on [employer] access.    For providers and/or their staff who would like to refer a patient to [facility], please contact us through our one-stop-shop provider referral line, [facility], at [phone].    If you feel you have received this communication in error or would no longer like to receive these types of communications, please e-mail [email or web address]

## 2018-01-15 NOTE — PROGRESS NOTES
[facility]    Consulting Provider: Dr. Kavon Villagran    CHIEF COMPLAINT:   Chief Complaint   Patient presents with    Knee Pain     right knee pain     HISTORY OF PRESENT ILLNESS: Jodie Mehta is a 86 y.o. female who presents to me for evaluation of chronic right knee pain. She is sent by Dr. Villagran as her  is interested in her getting a coolief procedure.     The patient is accompanied by her  who gives most of the history as the patient has Alzheimer's and is not a good historian. Mrs. Mehta reports that she has had this pain for many years and has a long history of bilateral OA in her knees. She is s/p TKA in left knee and has had platelet, stem cell, gel injections and cortisone shots to the right knee without any improvement.  reports her pain is constant- she will complain of this pain throughout the day and it will sometimes wake her in the middle of the night.  Pain is improved with rest and analgesics and exaccerbated by ambulation and ADLs.  reports that her pain is so severe that she is unable to walk backwards or sideways. At home she uses a walker to ambulate. Her  says that she will complain of a crunching sensation with walking and she seems to have weakness with the right leg going out periodically. She has had xrays of the knees demonstrating Kellgren-Lawerence Grade IV osteoarthritis, particularly in the medial compartment, with varus deformity, but she is not a surgical candidate. Her  says he realizes that RFA of the nerves will not cure his wife's OA , but he believes that her pain significantly impacts her quality of life and he interested in trying any procedure that might help her pain. Of note, Mrs. Mehta has a history of LE blood clots and now has an IVC filter.     Review of Systems   Constitutional: Negative for fever.   HENT: Negative for drooling.    Eyes: Negative for discharge.   Respiratory: Negative  for choking.  Positive for occasional SOB.  Cardiovascular: Negative for chest pain.   Genitourinary: Negative for flank pain.   Skin: Positive for thin skin, skin discoloration in right lower extremity, and slowly healing RLE skin tear.   Allergic/Immunologic: Negative for immunocompromised state.   Neurological: Negative for tremors and syncope.   Psychiatric/Behavioral: Negative for behavioral problems. Positive for confusion and memory loss due to Alzheimer's.     Past Medical History:   Past Medical History:   Diagnosis Date    Cancer     Cataract     Deep vein thrombosis     Degenerative disc disease     DEMENTIA     Emphysema of lung     Hypertension        Past Surgical History:   Past Surgical History:   Procedure Laterality Date    CHOLECYSTECTOMY      COLON SURGERY      HERNIA REPAIR      HYSTERECTOMY         Family History:   Family History   Problem Relation Age of Onset    Asthma Neg Hx     Emphysema Neg Hx        Medications:   Current Outpatient Prescriptions on File Prior to Visit   Medication Sig Dispense Refill    aspirin (ECOTRIN) 81 MG EC tablet Take 81 mg by mouth once daily.        azelastine (ASTELIN) 137 mcg (0.1 %) nasal spray       budesonide-formoterol 160-4.5 mcg (SYMBICORT) 160-4.5 mcg/actuation HFAA Inhale 2 puffs into the lungs every 12 (twelve) hours.        calcium carbonate-vit D3-min (CALTRATE 600+D PLUS MINERALS) 600 mg (1,500 mg)-400 unit Chew Take by mouth. 1 Tablet, Chewable Oral Every day      calcium citrate-vitamin D3 315-200 mg (CITRACAL+D) 315-200 mg-unit per tablet Take 1 tablet by mouth 2 (two) times daily.        chlorzoxazone (PARAFON FORTE) 500 mg Tab Take 500 mg by mouth 4 (four) times daily as needed.       cyclobenzaprine (FLEXERIL) 5 MG tablet Take 1 tablet (5 mg total) by mouth nightly. 30 tablet 2    dexlansoprazole (DEXILANT) 60 mg capsule Take 60 mg by mouth once daily.       donepezil (ARICEPT) 10 MG tablet Take 10 mg by mouth every  "evening.        hydrochlorothiazide (HYDRODIURIL) 25 MG tablet Take by mouth. 1 Tablet Oral Every morning      memantine (NAMENDA) 10 MG Tab Take 5 mg by mouth 2 (two) times daily.       naproxen (EC NAPROSYN) 500 MG EC tablet Take by mouth. 1 Tablet, Delayed Release (E.C.) Oral Twice a day      oxycodone (ROXICODONE) 5 MG immediate release tablet Take 1 tablet (5 mg total) by mouth every 4 (four) hours as needed for Pain. 20 tablet 0    potassium chloride (MICRO-K) 10 MEQ CpSR Take 10 mEq by mouth once daily.        solifenacin (VESICARE) 5 MG tablet Take 10 mg by mouth once daily.        telmisartan (MICARDIS) 80 MG Tab Take 40 mg by mouth once daily.        telmisartan-hydrochlorothiazide (MICARDIS HCT) 40-12.5 mg per tablet Take by mouth. 1 Tablet Oral Every day       No current facility-administered medications on file prior to visit.        Allergies:   Review of patient's allergies indicates:   Allergen Reactions    Sulfa (sulfonamide antibiotics) Rash       Social History:   Social History     Social History    Marital status:      Spouse name: N/A    Number of children: N/A    Years of education: N/A     Social History Main Topics    Smoking status: Former Smoker     Packs/day: 1.00     Years: 30.00     Types: Cigarettes     Quit date: 3/17/1985    Smokeless tobacco: Never Used      Comment: quit 30 years ago    Alcohol use No      Comment: occasionally wine    Drug use: No    Sexual activity: Not Asked     Other Topics Concern    None     Social History Narrative    None     PHYSICAL EXAMINATION:   General    Vitals:    01/15/18 1127   Weight: 84.4 kg (186 lb)   Height: 5' 2" (1.575 m)     Constitutional: Elderly obese woman in a wheelchair. No apparent distress. Appears well-developed and well-nourished. Pleasant.  HENT:   Head: Normocephalic and atraumatic.   Eyes: Right eye exhibits no discharge. Left eye exhibits no discharge. No scleral icterus.   Pulmonary/Chest: Effort " normal. No respiratory distress. Quiet breath sounds in left lung.  Abdominal: There is no guarding.   Neurological: Alert and grossly oriented to person and place, not formally tested. Frequently misidentifies as 88 years old.   Psychiatric: Behavior is appropriate, however patient endorses memory loss and having difficulty following my questions or statements as I am speaking too quickly. Needs  to help complete history and recall details.   Ortho Exam  RIGHT KNEE-  INSPECTION: +general bony deformity of both knees.  +ecchymoses and hyperpigmentation of the RLE along the distal shin. +varus deformity. There is is no erythema. There is no obvious effusion or swelling in right knee.   GAIT/DYNAMIC: Not observed. Patient remains in wheelchair throughout exam. Intact passive and active ROM within functional limits.   PALPATION: +TTP at right knee along medial joint line and along tibial tuberosity. Negative ballottement of right knee. +Medial compartment crepitus. + lax with valgus  NEURO: Sensation symmetrical bilaterally. DTRs +1 bilaterally. No clonus. Babinski equivocal bilaterally.   -Anterior drawer/Lachmans    Imaging- x-ray 11/14/17  Marked tricompartmental degenerative changes, severe medial joint space narrowing, and varus deformity of the right knee.    Data Reviewed: X-ray    Supportive Actions: Independent visualization of images or test specimens    ASSESSMENT:   1.       Chronic pain of right knee  2.      Osteoarthritis of right knee    PLAN:     1. Time was spent reviewing the above diagnosis in depth with Jodie and her  today, including acute management and rehabilitation.     2. As patient has tried multiple interventions without significant pain relief or functional improvements and is not a surgical candidate due to poor overall health and limited ability to follow instructions secondary to Alzheimer's, she is a good candidate for genicular RFA. Pain is significantly effecting her  "quality of life and this procedure has potential to reduce her pain and improve her function. This option was discussed at length with Jodie and her , who feel that this is the best option currently on the table. We will plan to have her scheduled for the procedure at the next available procedure day.  Of note, we will bypass the genicular diagnostic block, secondary to her Alzheimer's, which would make it difficult for her to accurately quantify her pain relief following that procedure.  We will move straight to the genicular radiofrequency ablation.    3. Return in 2 weeks for the coolief RFA of the right knee.     This is a consult from Dr. Kavon Villagran. Please see the "Communications" section of Epic to see how the consulting physician received the report of today's findings and recommendations. If it's an [facility] physician, it will be forwarded to his/her "in basket".    The above note was completed, in part, with the aid of Dragon dictation software/hardware. Translation errors may be present.    "

## 2018-01-18 DIAGNOSIS — G89.29 CHRONIC PAIN OF RIGHT KNEE: Primary | ICD-10-CM

## 2018-01-18 DIAGNOSIS — M25.561 CHRONIC PAIN OF RIGHT KNEE: Primary | ICD-10-CM

## 2018-01-18 RX ORDER — SODIUM CHLORIDE, SODIUM LACTATE, POTASSIUM CHLORIDE, CALCIUM CHLORIDE 600; 310; 30; 20 MG/100ML; MG/100ML; MG/100ML; MG/100ML
INJECTION, SOLUTION INTRAVENOUS CONTINUOUS
Status: CANCELLED | OUTPATIENT
Start: 2018-01-18

## 2018-01-18 RX ORDER — LIDOCAINE HYDROCHLORIDE 10 MG/ML
1 INJECTION, SOLUTION EPIDURAL; INFILTRATION; INTRACAUDAL; PERINEURAL ONCE
Status: CANCELLED | OUTPATIENT
Start: 2018-01-18 | End: 2018-01-18

## 2018-01-18 RX ORDER — MIDAZOLAM HYDROCHLORIDE 5 MG/ML
2 INJECTION INTRAMUSCULAR; INTRAVENOUS ONCE AS NEEDED
Status: CANCELLED | OUTPATIENT
Start: 2018-01-18 | End: 2018-01-18

## 2018-02-22 ENCOUNTER — TELEPHONE (OUTPATIENT)
Dept: PHYSICAL MEDICINE AND REHAB | Facility: CLINIC | Age: 83
End: 2018-02-22

## 2018-02-22 DIAGNOSIS — M25.561 RIGHT KNEE PAIN: Primary | ICD-10-CM

## 2018-02-22 NOTE — TELEPHONE ENCOUNTER
----- Message from Houston Cheney sent at 2/22/2018  1:33 PM CST -----  Contact: Parish- spouse  calling about her appointment tomorrow morning  he said he was told to call if he had not heard anything today.   Call back [phone] (home)

## 2018-02-22 NOTE — TELEPHONE ENCOUNTER
Spoke with patient's  advised that the surgery is scheduled for 9 am and usually arrival time is one hour prior to procedure unless otherwise instructed by surgery nurses he will arrive at 8 am

## 2018-02-23 ENCOUNTER — SURGERY (OUTPATIENT)
Age: 83
End: 2018-02-23

## 2018-02-23 ENCOUNTER — HOSPITAL ENCOUNTER (OUTPATIENT)
Facility: HOSPITAL | Age: 83
Discharge: HOME | End: 2018-02-23
Attending: PHYSICAL MEDICINE & REHABILITATION | Admitting: PHYSICAL MEDICINE & REHABILITATION
Payer: MEDICARE

## 2018-02-23 ENCOUNTER — HOSPITAL ENCOUNTER (OUTPATIENT)
Dept: RADIOLOGY | Facility: HOSPITAL | Age: 83
Discharge: HOME | End: 2018-02-23
Attending: PHYSICAL MEDICINE & REHABILITATION
Payer: MEDICARE

## 2018-02-23 VITALS
HEIGHT: 62 IN | SYSTOLIC BLOOD PRESSURE: 180 MMHG | WEIGHT: 190 LBS | OXYGEN SATURATION: 90 % | HEART RATE: 78 BPM | BODY MASS INDEX: 34.96 KG/M2 | TEMPERATURE: 98 F | RESPIRATION RATE: 16 BRPM | DIASTOLIC BLOOD PRESSURE: 80 MMHG

## 2018-02-23 DIAGNOSIS — M25.561 CHRONIC PAIN OF RIGHT KNEE: ICD-10-CM

## 2018-02-23 DIAGNOSIS — M25.561 RIGHT KNEE PAIN: ICD-10-CM

## 2018-02-23 DIAGNOSIS — G89.29 CHRONIC PAIN OF RIGHT KNEE: ICD-10-CM

## 2018-02-23 PROCEDURE — 63600175 PHARM REV CODE 636 W HCPCS: Mod: PO | Performed by: PHYSICAL MEDICINE & REHABILITATION

## 2018-02-23 PROCEDURE — 64640 INJECTION TREATMENT OF NERVE: CPT | Mod: PO | Performed by: PHYSICAL MEDICINE & REHABILITATION

## 2018-02-23 PROCEDURE — 25000003 PHARM REV CODE 250: Mod: PO | Performed by: PHYSICAL MEDICINE & REHABILITATION

## 2018-02-23 PROCEDURE — 64640 INJECTION TREATMENT OF NERVE: CPT | Mod: RT,,, | Performed by: PHYSICAL MEDICINE & REHABILITATION

## 2018-02-23 PROCEDURE — 76000 FLUOROSCOPY <1 HR PHYS/QHP: CPT | Mod: TC,PO

## 2018-02-23 RX ORDER — AMLODIPINE BESYLATE 2.5 MG/1
2.5 TABLET ORAL DAILY
COMMUNITY

## 2018-02-23 RX ORDER — LIDOCAINE HYDROCHLORIDE 10 MG/ML
INJECTION, SOLUTION EPIDURAL; INFILTRATION; INTRACAUDAL; PERINEURAL
Status: DISCONTINUED | OUTPATIENT
Start: 2018-02-23 | End: 2018-02-23 | Stop reason: HOSPADM

## 2018-02-23 RX ORDER — MIDAZOLAM HYDROCHLORIDE 1 MG/ML
2 INJECTION INTRAMUSCULAR; INTRAVENOUS ONCE AS NEEDED
Status: COMPLETED | OUTPATIENT
Start: 2018-02-23 | End: 2018-02-23

## 2018-02-23 RX ORDER — LIDOCAINE HYDROCHLORIDE 20 MG/ML
INJECTION, SOLUTION EPIDURAL; INFILTRATION; INTRACAUDAL; PERINEURAL
Status: DISCONTINUED | OUTPATIENT
Start: 2018-02-23 | End: 2018-02-23 | Stop reason: HOSPADM

## 2018-02-23 RX ORDER — LIDOCAINE HYDROCHLORIDE 10 MG/ML
1 INJECTION, SOLUTION EPIDURAL; INFILTRATION; INTRACAUDAL; PERINEURAL ONCE
Status: DISCONTINUED | OUTPATIENT
Start: 2018-02-23 | End: 2018-02-23 | Stop reason: HOSPADM

## 2018-02-23 RX ORDER — DEXAMETHASONE SODIUM PHOSPHATE 4 MG/ML
INJECTION, SOLUTION INTRA-ARTICULAR; INTRALESIONAL; INTRAMUSCULAR; INTRAVENOUS; SOFT TISSUE
Status: DISCONTINUED | OUTPATIENT
Start: 2018-02-23 | End: 2018-02-23 | Stop reason: HOSPADM

## 2018-02-23 RX ORDER — SODIUM CHLORIDE, SODIUM LACTATE, POTASSIUM CHLORIDE, CALCIUM CHLORIDE 600; 310; 30; 20 MG/100ML; MG/100ML; MG/100ML; MG/100ML
INJECTION, SOLUTION INTRAVENOUS CONTINUOUS
Status: DISCONTINUED | OUTPATIENT
Start: 2018-02-23 | End: 2018-02-23 | Stop reason: HOSPADM

## 2018-02-23 RX ADMIN — DEXAMETHASONE SODIUM PHOSPHATE 4 MG: 4 INJECTION, SOLUTION INTRAMUSCULAR; INTRAVENOUS at 10:02

## 2018-02-23 RX ADMIN — MIDAZOLAM HYDROCHLORIDE 0.5 MG: 1 INJECTION, SOLUTION INTRAMUSCULAR; INTRAVENOUS at 09:02

## 2018-02-23 RX ADMIN — LIDOCAINE HYDROCHLORIDE 4 ML: 20 INJECTION, SOLUTION EPIDURAL; INFILTRATION; INTRACAUDAL; PERINEURAL at 10:02

## 2018-02-23 RX ADMIN — LIDOCAINE HYDROCHLORIDE 20 ML: 10 INJECTION, SOLUTION EPIDURAL; INFILTRATION; INTRACAUDAL; PERINEURAL at 10:02

## 2018-02-23 RX ADMIN — SODIUM CHLORIDE, SODIUM LACTATE, POTASSIUM CHLORIDE, CALCIUM CHLORIDE: 600; 310; 30; 20 INJECTION, SOLUTION INTRAVENOUS at 09:02

## 2018-02-23 NOTE — OP NOTE
Operative Note       Surgery Date: 2/23/2018     Surgeon(s) and Role:     * Jerson Delgado MD - Primary    Pre-op Diagnosis:  Chronic pain of right knee [M25.561, G89.29]    Post-op Diagnosis: Post-Op Diagnosis Codes:     * Chronic pain of right knee [M25.561, G89.29]    Procedure:  1.  Right knee genicular nerve radiofrequency ablation ×4 nerve branches.  2.  Fluoroscopic guidance.    Anesthesia: RN IV Sedation    Description of Procedure:    Jodie Mehta is a 86 y.o. female with chronic right knee pain presents for an elective radiofrequency genicular nerve ablation of a total of 4 individual genicular branches of the right knee. We discussed risks, benefits, and alternatives. Patient's verbal and written consent was obtained. She was brought to the fluoro scopic suite, placed in supine position. The right knee was prepped and draped in the usual sterile fashion. 1% lidocaine was used to anesthetize the overlying subcutaneous cutaneous tissues at each of the 4 sites using a 27-gauge 1.5 inch needle on a 10 cc syringe. Using AP and lateral views, a 17-gauge introducer needle was guided under intermittent fluoroscopic imaging first to the distal metaphysis of the medial aspect of the femur. A similar needle was placed at the lateral aspect of the distal femur. A third introducer needle was guided to the proximal aspect of the tibial metaphysis medially. Finally, the fourth needle was introduced to the distal femur, two finger breadths proximal to the superior border of the patella. All 4 needles being placed near or around each genicular nerve origin prior to entering the knee joint. Motor stimulation was then performed at 2 V. There were no observed muscle contractions at either of the 4 sites. Following motor stimulation 1 cc of 2% lidocaine was then injected into the introducer needles and stylets were replaced. Next, the radio frequency probe was placed into the introducer needle and  radiofrequency lesioning was performed at 84°C for 2 minutes, 30 seconds. Next, the probe was removed and placed in the second genicular location and radiofrequency lesioning was performed here. Finally, the third and fourth radio frequency lesioning was performed at the last two branches. Following lesioning, a mixture of 1 cc of dexamethasone, 4 mg with 3cc 1% lidocaine was injected through the introducer needles. The needles were then re-styletted and removed. A total of 4 nerve branches were lesioned today. Band-Aids were applied to the puncture sites after applying antibiotic ointment. The patient was transported to the postoperative recovery area in good condition. Approximately 20 minutes after the procedure, motor strength examination revealed no weakness. The patient reported no paresthesias in the affected extremity. Patient was discharged in good condition and will follow-up in 2 weeks in the office.     Estimated Blood Loss: Minimal    Attestation:  I performed the procedure.           Discharge Note    Admit Date: 2/23/2018    Attending Physician: Jerson Delgado MD     Discharge Physician: Jerson Delgado MD    Final Diagnosis: Post-Op Diagnosis Codes:     * Chronic pain of right knee [M25.561, G89.29]    Disposition: Home or Self Care, pt discharged in good condition and will f/u in clinic in 2 weeks.    Patient Instructions:   Current Discharge Medication List      CONTINUE these medications which have NOT CHANGED    Details   amLODIPine (NORVASC) 2.5 MG tablet Take 2.5 mg by mouth once daily.      aspirin (ECOTRIN) 81 MG EC tablet Take 81 mg by mouth once daily.        azelastine (ASTELIN) 137 mcg (0.1 %) nasal spray       budesonide-formoterol 160-4.5 mcg (SYMBICORT) 160-4.5 mcg/actuation HFAA Inhale 2 puffs into the lungs every 12 (twelve) hours.        calcium carbonate-vit D3-min (CALTRATE 600+D PLUS MINERALS) 600 mg (1,500 mg)-400 unit Chew Take by mouth. 1 Tablet, Chewable Oral  Every day      calcium citrate-vitamin D3 315-200 mg (CITRACAL+D) 315-200 mg-unit per tablet Take 1 tablet by mouth 2 (two) times daily.        chlorzoxazone (PARAFON FORTE) 500 mg Tab Take 500 mg by mouth 4 (four) times daily as needed.       cyclobenzaprine (FLEXERIL) 5 MG tablet Take 1 tablet (5 mg total) by mouth nightly.  Qty: 30 tablet, Refills: 2    Associated Diagnoses: Lumbosacral spondylosis without myelopathy      dexlansoprazole (DEXILANT) 60 mg capsule Take 60 mg by mouth once daily.       donepezil (ARICEPT) 10 MG tablet Take 10 mg by mouth every evening.        hydrochlorothiazide (HYDRODIURIL) 25 MG tablet Take by mouth. 1 Tablet Oral Every morning      memantine (NAMENDA) 10 MG Tab Take 5 mg by mouth 2 (two) times daily.       naproxen (EC NAPROSYN) 500 MG EC tablet Take by mouth. 1 Tablet, Delayed Release (E.C.) Oral Twice a day      oxycodone (ROXICODONE) 5 MG immediate release tablet Take 1 tablet (5 mg total) by mouth every 4 (four) hours as needed for Pain.  Qty: 20 tablet, Refills: 0      potassium chloride (MICRO-K) 10 MEQ CpSR Take 10 mEq by mouth once daily.        solifenacin (VESICARE) 5 MG tablet Take 10 mg by mouth once daily.        telmisartan-hydrochlorothiazide (MICARDIS HCT) 40-12.5 mg per tablet Take by mouth. 1 Tablet Oral Every day             Discharge Procedure Orders (must include Diet, Follow-up, Activity)    Discharge Procedure Orders (must include Diet, Follow-up, Activity)  Diet general     Activity as tolerated     Shower on day dressing removed (No bath)     Ice to affected area     Call MD for:  temperature >100.4     Call MD for:  persistent nausea and vomiting     Call MD for:  severe uncontrolled pain     Call MD for:  difficulty breathing, headache or visual disturbances     Call MD for:  redness, tenderness, or signs of infection (pain, swelling, redness, odor or green/yellow discharge around incision site)     Call MD for:  hives     Call MD for:  persistent  dizziness or light-headedness     Call MD for:  extreme fatigue     No dressing needed          Discharge Date: 2/23/18

## 2018-02-23 NOTE — DISCHARGE INSTRUCTIONS
Home care instructions  Keep site clean and dry for 24 hours, remove bandaid when desired  Do not drive until tomorrow  Take care when walking after a lumbar injection  Avoid strenuous activities for 2 days  Resume home medication as prescribed today  Resume Aspirin, Plavix, or Coumadin the day after the procedure unless otherwise instructed.    SEE IMMEDIATE MEDICAL HELP FOR:  Severe increase in your usual pain or appearance of new pain  Prolonged or increasing weakness or numbness in the legs or arms  Drainage, redness, active bleeding, or increased swelling at the injection site  Temperature over 100.0 degrees F.  Headache that increases when your head is upright and decreases when you lie flat    CALL 911 OR GO DIRECTLY TO EMERGENCY DEPARTMENT FOR:  Shortness of breath, chest pain, or problems breathing      Recovery After Procedural Sedation (Adult)  You have been given medicine by vein to make you sleep during your surgery. This may have included both a pain medicine and sleeping medicine. Most of the effects have worn off. But you may still have some drowsiness for the next 6 to 8 hours.  Home care  Follow these guidelines when you get home:  · For the next 8 hours, you should be watched by a responsible adult. This person should make sure your condition is not getting worse.  · Don't drink any alcohol for the next 24 hours.  · Don't drive, operate dangerous machinery, or make important business or personal decisions during the next 24 hours.  Note: Your healthcare provider may tell you not to take any medicine by mouth for pain or sleep in the next 4 hours. These medicines may react with the medicines you were given in the hospital. This could cause a much stronger response than usual.  Follow-up care  Follow up with your healthcare provider if you are not alert and back to your usual level of activity within 12 hours.  When to seek medical advice  Call your healthcare provider right away if any of these  occur:  · Drowsiness gets worse  · Weakness or dizziness gets worse  · Repeated vomiting  · You can't be awakened   Date Last Reviewed: 10/18/2016  © 5008-0394 [employer]. [address] [place] [address], [place] [address]. All rights reserved. This information is not intended as a substitute for professional medical care. Always follow your healthcare professional's instructions.

## 2018-02-23 NOTE — HPI
[facility]     CHIEF COMPLAINT:        Chief Complaint   Patient presents with    Knee Pain       right knee pain      HISTORY OF PRESENT ILLNESS: Jodie Mehta is a 86 y.o. female who presents to me for elective radiofrequency ablation of the genicular nerve branches to the right knee.      Review of Systems   Constitutional: Negative for fever.   HENT: Negative for drooling.    Eyes: Negative for discharge.   Respiratory: Negative for choking.   Cardiovascular: Negative for chest pain.   Genitourinary: Negative for flank pain.   Skin: Neg for rash.  Allergic/Immunologic: Negative for immunocompromised state.   Neurological: Negative for tremors and syncope.   Psychiatric/Behavioral: Negative for behavioral problems. Positive for confusion and memory loss due to Alzheimer's.      Past Medical History:        Past Medical History:   Diagnosis Date    Cancer      Cataract      Deep vein thrombosis      Degenerative disc disease      DEMENTIA      Emphysema of lung      Hypertension           Past Surgical History:         Past Surgical History:   Procedure Laterality Date    CHOLECYSTECTOMY        COLON SURGERY        HERNIA REPAIR        HYSTERECTOMY             Family History:         Family History   Problem Relation Age of Onset    Asthma Neg Hx      Emphysema Neg Hx           Medications:          Current Outpatient Prescriptions on File Prior to Visit   Medication Sig Dispense Refill    aspirin (ECOTRIN) 81 MG EC tablet Take 81 mg by mouth once daily.          azelastine (ASTELIN) 137 mcg (0.1 %) nasal spray          budesonide-formoterol 160-4.5 mcg (SYMBICORT) 160-4.5 mcg/actuation HFAA Inhale 2 puffs into the lungs every 12 (twelve) hours.          calcium carbonate-vit D3-min (CALTRATE 600+D PLUS MINERALS) 600 mg (1,500 mg)-400 unit Chew Take by mouth. 1 Tablet, Chewable Oral Every day        calcium citrate-vitamin D3 315-200 mg (CITRACAL+D) 315-200 mg-unit per  tablet Take 1 tablet by mouth 2 (two) times daily.          chlorzoxazone (PARAFON FORTE) 500 mg Tab Take 500 mg by mouth 4 (four) times daily as needed.         cyclobenzaprine (FLEXERIL) 5 MG tablet Take 1 tablet (5 mg total) by mouth nightly. 30 tablet 2    dexlansoprazole (DEXILANT) 60 mg capsule Take 60 mg by mouth once daily.         donepezil (ARICEPT) 10 MG tablet Take 10 mg by mouth every evening.          hydrochlorothiazide (HYDRODIURIL) 25 MG tablet Take by mouth. 1 Tablet Oral Every morning        memantine (NAMENDA) 10 MG Tab Take 5 mg by mouth 2 (two) times daily.         naproxen (EC NAPROSYN) 500 MG EC tablet Take by mouth. 1 Tablet, Delayed Release (E.C.) Oral Twice a day        oxycodone (ROXICODONE) 5 MG immediate release tablet Take 1 tablet (5 mg total) by mouth every 4 (four) hours as needed for Pain. 20 tablet 0    potassium chloride (MICRO-K) 10 MEQ CpSR Take 10 mEq by mouth once daily.          solifenacin (VESICARE) 5 MG tablet Take 10 mg by mouth once daily.          telmisartan (MICARDIS) 80 MG Tab Take 40 mg by mouth once daily.          telmisartan-hydrochlorothiazide (MICARDIS HCT) 40-12.5 mg per tablet Take by mouth. 1 Tablet Oral Every day          No current facility-administered medications on file prior to visit.          Allergies:        Review of patient's allergies indicates:   Allergen Reactions    Sulfa (sulfonamide antibiotics) Rash         Social History:   Social History            Social History    Marital status:        Spouse name: N/A    Number of children: N/A    Years of education: N/A             Social History Main Topics    Smoking status: Former Smoker       Packs/day: 1.00       Years: 30.00       Types: Cigarettes       Quit date: 3/17/1985    Smokeless tobacco: Never Used         Comment: quit 30 years ago    Alcohol use No         Comment: occasionally wine    Drug use: No    Sexual activity: Not Asked           Other Topics  Concern    None          Social History Narrative    None      PHYSICAL EXAMINATION:   General     Vitals                         VSS   Constitutional: Elderly obese woman in a wheelchair. No apparent distress. Appears well-developed and well-nourished. Pleasant.  HENT:   Head: Normocephalic and atraumatic.   Eyes: Right eye exhibits no discharge. Left eye exhibits no discharge. No scleral icterus.   Pulmonary/Chest: Effort normal. No respiratory distress.  Abdominal: There is no guarding.   Neurological: Alert and grossly oriented to person and place, not formally tested.  Psychiatric: Behavior is appropriate.  Ortho Exam  RIGHT KNEE-  INSPECTION: +general bony deformity of both knees.  +ecchymoses and hyperpigmentation of the RLE along the distal shin. +varus deformity. There is is no erythema. There is no obvious effusion or swelling in right knee.   GAIT/DYNAMIC: Not observed. Patient remains in wheelchair throughout exam. Intact passive and active ROM within functional limits.   PALPATION: +TTP at right knee along medial joint line and along tibial tuberosity. Negative ballottement of right knee. +Medial compartment crepitus. + lax with valgus  NEURO: Sensation symmetrical bilaterally. DTRs +1 bilaterally. No clonus. Babinski equivocal bilaterally.   -Anterior drawer/Lachmans     Imaging- x-ray 11/14/17  Marked tricompartmental degenerative changes, severe medial joint space narrowing, and varus deformity of the right knee.     Data Reviewed: X-ray     Supportive Actions: Independent visualization of images or test specimens     ASSESSMENT:   1.       Chronic pain of right knee  2.      Osteoarthritis of right knee     PLAN:      1. We will proceed today with right knee genicular nerve radiofrequency ablation, pt consented.    2. F/u in clinic in 2 weeks.

## 2018-03-12 ENCOUNTER — OFFICE VISIT (OUTPATIENT)
Dept: PHYSICAL MEDICINE AND REHAB | Facility: CLINIC | Age: 83
End: 2018-03-12
Payer: MEDICARE

## 2018-03-12 VITALS
DIASTOLIC BLOOD PRESSURE: 49 MMHG | BODY MASS INDEX: 34.96 KG/M2 | HEIGHT: 62 IN | SYSTOLIC BLOOD PRESSURE: 89 MMHG | HEART RATE: 66 BPM | WEIGHT: 190 LBS

## 2018-03-12 DIAGNOSIS — G89.29 CHRONIC PAIN OF RIGHT KNEE: Primary | ICD-10-CM

## 2018-03-12 DIAGNOSIS — M25.561 CHRONIC PAIN OF RIGHT KNEE: Primary | ICD-10-CM

## 2018-03-12 DIAGNOSIS — M17.11 PRIMARY OSTEOARTHRITIS OF RIGHT KNEE: ICD-10-CM

## 2018-03-12 PROCEDURE — 99212 OFFICE O/P EST SF 10 MIN: CPT | Mod: S$PBB,,, | Performed by: PHYSICAL MEDICINE & REHABILITATION

## 2018-03-12 PROCEDURE — 99213 OFFICE O/P EST LOW 20 MIN: CPT | Mod: PBBFAC,PN | Performed by: PHYSICAL MEDICINE & REHABILITATION

## 2018-03-12 PROCEDURE — 99999 PR PBB SHADOW E&M-EST. PATIENT-LVL III: CPT | Mod: PBBFAC,,, | Performed by: PHYSICAL MEDICINE & REHABILITATION

## 2018-03-12 NOTE — PROGRESS NOTES
"[facility]    CHIEF COMPLAINT:   Chief Complaint   Patient presents with    Follow-up     right knee follow up     HISTORY OF PRESENT ILLNESS: Jodie Mehta is a 86 y.o. female who presents to me for evaluation of chronic right knee pain. She returns for follow up s/p cooled radiofrequency ablation of the right knee on 2/23/2018. She was by Dr. Villagran for the procedure. Again, the patient is accompanied by her  who gives most of the history as the patient has Alzheimer's and is not a good historian. She and her  report that the right knee pain persists, and the cooled RFA did not provide pain relief at all. They report that her pain is of the same type and quality.     Mrs. Mehta reports that she has had this pain for many years and has a long history of bilateral OA in her knees. She is s/p TKA ("many years ago") in left knee and has had platelet, stem cell, gel injections and cortisone shots to the right knee without any improvement.  reports her pain is constant- she will complain of this pain throughout the day and it will sometimes wake her in the middle of the night.  Pain is improved with rest and analgesics and exacerbated by ambulation and ADLs.  reports that her pain is so severe that she is unable to walk backwards or sideways. At home she uses a walker to ambulate, and uses a wheelchair in the community. Her  says that she will complain of a crunching sensation with walking and she seems to have weakness with the right leg going out periodically. She has had xrays of the knees demonstrating Kellgren-Lawerence Grade IV osteoarthritis, particularly in the medial compartment, with varus deformity, but she is not a surgical candidate. She has tried bracing, topicals, and oral medications which have not been beneficial either. Of note, Mrs. Mehta has a history of LE blood clots and now has an IVC filter.     Review of Systems "   Constitutional: Negative for fever.   HENT: Negative for drooling.    Eyes: Negative for discharge.   Respiratory: Negative for choking.  Positive for occasional SOB.  Cardiovascular: Negative for chest pain.   Genitourinary: Negative for flank pain.   Skin: Positive for thin skin, skin discoloration in right lower extremity, and slowly healing RLE skin tear.   Allergic/Immunologic: Negative for immunocompromised state.   Neurological: Negative for tremors and syncope.   Psychiatric/Behavioral: Negative for behavioral problems. Positive for confusion and memory loss due to Alzheimer's.     Past Medical History:   Past Medical History:   Diagnosis Date    Alzheimer disease     Cancer 2003    colon    Cataract     Deep vein thrombosis     Degenerative disc disease     DEMENTIA     Emphysema of lung     Hypertension        Past Surgical History:   Past Surgical History:   Procedure Laterality Date    BACK SURGERY      CHOLECYSTECTOMY      COLON SURGERY      colon resection    JOSE FILTER PLACEMENT      HERNIA REPAIR      HYSTERECTOMY      loop recorder      TOTAL KNEE ARTHROPLASTY Left        Family History:   Family History   Problem Relation Age of Onset    Asthma Neg Hx     Emphysema Neg Hx        Medications:   Current Outpatient Prescriptions on File Prior to Visit   Medication Sig Dispense Refill    amLODIPine (NORVASC) 2.5 MG tablet Take 2.5 mg by mouth once daily.      aspirin (ECOTRIN) 81 MG EC tablet Take 81 mg by mouth once daily.        azelastine (ASTELIN) 137 mcg (0.1 %) nasal spray       budesonide-formoterol 160-4.5 mcg (SYMBICORT) 160-4.5 mcg/actuation HFAA Inhale 2 puffs into the lungs every 12 (twelve) hours.        calcium carbonate-vit D3-min (CALTRATE 600+D PLUS MINERALS) 600 mg (1,500 mg)-400 unit Chew Take by mouth. 1 Tablet, Chewable Oral Every day      calcium citrate-vitamin D3 315-200 mg (CITRACAL+D) 315-200 mg-unit per tablet Take 1 tablet by mouth 2 (two)  times daily.        chlorzoxazone (PARAFON FORTE) 500 mg Tab Take 500 mg by mouth 4 (four) times daily as needed.       cyclobenzaprine (FLEXERIL) 5 MG tablet Take 1 tablet (5 mg total) by mouth nightly. 30 tablet 2    dexlansoprazole (DEXILANT) 60 mg capsule Take 60 mg by mouth once daily.       donepezil (ARICEPT) 10 MG tablet Take 10 mg by mouth every evening.        hydrochlorothiazide (HYDRODIURIL) 25 MG tablet Take by mouth. 1 Tablet Oral Every morning      memantine (NAMENDA) 10 MG Tab Take 5 mg by mouth 2 (two) times daily.       naproxen (EC NAPROSYN) 500 MG EC tablet Take by mouth. 1 Tablet, Delayed Release (E.C.) Oral Twice a day      oxycodone (ROXICODONE) 5 MG immediate release tablet Take 1 tablet (5 mg total) by mouth every 4 (four) hours as needed for Pain. 20 tablet 0    potassium chloride (MICRO-K) 10 MEQ CpSR Take 10 mEq by mouth once daily.        solifenacin (VESICARE) 5 MG tablet Take 10 mg by mouth once daily.        telmisartan-hydrochlorothiazide (MICARDIS HCT) 40-12.5 mg per tablet Take by mouth. 1 Tablet Oral Every day       No current facility-administered medications on file prior to visit.      Allergies:   Review of patient's allergies indicates:   Allergen Reactions    Sulfa (sulfonamide antibiotics) Rash       Social History:   Social History     Social History    Marital status:      Spouse name: N/A    Number of children: N/A    Years of education: N/A     Social History Main Topics    Smoking status: Former Smoker     Packs/day: 1.00     Years: 30.00     Types: Cigarettes     Quit date: 3/17/1985    Smokeless tobacco: Never Used      Comment: quit 30 years ago    Alcohol use No      Comment: occasionally wine    Drug use: No    Sexual activity: Not Asked     Other Topics Concern    None     Social History Narrative    None     PHYSICAL EXAMINATION:   General    Vitals:    03/12/18 1358   BP: (!) 89/49   Pulse: 66   Weight: 86.2 kg (190 lb)   Height: 5'  "2" (1.575 m)     Constitutional: Elderly obese woman in a wheelchair. No apparent distress. Appears well-developed and well-nourished. Pleasant.  HENT:   Head: Normocephalic and atraumatic.   Eyes: Right eye exhibits no discharge. Left eye exhibits no discharge. No scleral icterus.   Pulmonary/Chest: Effort normal. No respiratory distress.  Abdominal: There is no guarding.   Neurological: Alert and grossly oriented to person and place, not formally tested.   Psychiatric: Behavior is appropriate, however patient endorses memory loss and having difficulty following my questions or statements as I am speaking too quickly. Needs  to help complete history and recall details.     Right Knee Exam     Tenderness   The patient is experiencing tenderness in the medial joint line, pes anserinus and patellar tendon.    Range of Motion   Extension: 10   Flexion: 110     Tests   Ana:  Medial - positive Lateral - negative  Lachman:  Anterior - negative      Drawer:       Anterior - negative      Varus: negative  Valgus: positive  Pivot Shift: negative  Patellar Apprehension: negative    Other   Erythema: absent  Scars: absent  Sensation: normal  Pulse: present  Swelling: none  Other tests: no effusion present    Comments:  +general bony deformity of both knees.  +ecchymoses and hyperpigmentation of the RLE along the distal shin. +varus deformity. There is is no erythema. There is no obvious effusion or swelling in right knee.         GAIT/DYNAMIC: Not observed. Patient remains in wheelchair throughout exam. Intact passive and active ROM within functional limits.      Imaging- x-ray from 11/14/17 of bilateral knees: Four views of the bilateral knees.  Stable appearance and alignment of left total knee arthroplasty. No evidence of hardware failure. Marked tricompartmental degenerative change and severe medial joint space narrowing involving the right knee with varus deformity.    Data Reviewed: X-ray    Supportive " Actions: Independent visualization of images or test specimens    ASSESSMENT:   1. Chronic pain of right knee  SM US Guidance For Needle Placement   2. Primary osteoarthritis of right knee       PLAN:     1.  Unfortunately, Mrs. Mehta has not responded to the genicular radiofrequency ablation.  We were unable to perform the diagnostic genicular trial block beforehand secondary to her cognitive status, however her severe medial knee osteoarthritis is most likely her primary pain generator.  We discussed that genicular ablation is unable to address all nerve innervation to the joint, however being 2 weeks status post, I would expect more significant pain relief then which she has been reporting at this point.  She does have the cognitive impairment, however she does appear to be more lucid on today's visit.  She does have severe medial osteoarthritis with some likely bony destruction.  We discussed that genicular ablation does not ablate nerve innervation within the bones, and due to her severe cartilage loss, bony friction could be contributing to her continued pain.  She has tried multiple interventions without significant pain relief or functional improvements and is not a surgical candidate due to poor overall health and Alzheimer's, we are limited in non-operative treatments. We supplied the patient and her  with Dr. Shnaks's information to consider Iovera treatment. We also provided a prescription for a compound cream to trial. Long-acting corticosteroid injection (Zilretta) may also be considered in the future.    2. Return on an as needed basis.     The above note was completed, in part, with the aid of Dragon dictation software/hardware. Translation errors may be present.

## 2018-06-04 ENCOUNTER — TELEPHONE (OUTPATIENT)
Dept: PODIATRY | Facility: CLINIC | Age: 83
End: 2018-06-04

## 2018-06-04 NOTE — TELEPHONE ENCOUNTER
----- Message from Maria Mc sent at 6/4/2018  8:37 AM CDT -----  Contact: patient   Patient calling to schedule a same day appointment today in the afternoon. No available appointment. Patient has heel pain.   Call back    Thanks!

## 2018-06-05 ENCOUNTER — OFFICE VISIT (OUTPATIENT)
Dept: PODIATRY | Facility: CLINIC | Age: 83
End: 2018-06-05
Payer: MEDICARE

## 2018-06-05 ENCOUNTER — HOSPITAL ENCOUNTER (OUTPATIENT)
Dept: RADIOLOGY | Facility: HOSPITAL | Age: 83
Discharge: HOME | End: 2018-06-05
Attending: PODIATRIST
Payer: MEDICARE

## 2018-06-05 VITALS
DIASTOLIC BLOOD PRESSURE: 43 MMHG | BODY MASS INDEX: 33.66 KG/M2 | TEMPERATURE: 96 F | HEIGHT: 63 IN | WEIGHT: 190 LBS | SYSTOLIC BLOOD PRESSURE: 104 MMHG | HEART RATE: 75 BPM

## 2018-06-05 DIAGNOSIS — M85.872 OSTEOPENIA OF BOTH FEET: ICD-10-CM

## 2018-06-05 DIAGNOSIS — M72.2 PLANTAR FASCIITIS: Primary | ICD-10-CM

## 2018-06-05 DIAGNOSIS — M79.672 PAIN OF BOTH HEELS: ICD-10-CM

## 2018-06-05 DIAGNOSIS — G57.91 NEURITIS OF RIGHT FOOT: ICD-10-CM

## 2018-06-05 DIAGNOSIS — M77.32 CALCANEAL SPUR OF LEFT FOOT: ICD-10-CM

## 2018-06-05 DIAGNOSIS — M79.671 PAIN OF BOTH HEELS: ICD-10-CM

## 2018-06-05 DIAGNOSIS — M85.871 OSTEOPENIA OF BOTH FEET: ICD-10-CM

## 2018-06-05 PROCEDURE — 99214 OFFICE O/P EST MOD 30 MIN: CPT | Mod: S$PBB,,, | Performed by: PODIATRIST

## 2018-06-05 PROCEDURE — 73620 X-RAY EXAM OF FOOT: CPT | Mod: 26,50,, | Performed by: RADIOLOGY

## 2018-06-05 PROCEDURE — 96372 THER/PROPH/DIAG INJ SC/IM: CPT | Mod: PBBFAC,RT

## 2018-06-05 PROCEDURE — 73620 X-RAY EXAM OF FOOT: CPT | Mod: 50,TC,FY

## 2018-06-05 PROCEDURE — 99213 OFFICE O/P EST LOW 20 MIN: CPT | Mod: 25,PBBFAC | Performed by: PODIATRIST

## 2018-06-05 PROCEDURE — 99999 PR PBB SHADOW E&M-EST. PATIENT-LVL III: CPT | Mod: 25,PBBFAC,, | Performed by: PODIATRIST

## 2018-06-05 RX ORDER — BETAMETHASONE SODIUM PHOSPHATE AND BETAMETHASONE ACETATE 3; 3 MG/ML; MG/ML
18 INJECTION, SUSPENSION INTRA-ARTICULAR; INTRALESIONAL; INTRAMUSCULAR; SOFT TISSUE
Status: COMPLETED | OUTPATIENT
Start: 2018-06-05 | End: 2018-06-05

## 2018-06-05 RX ADMIN — BETAMETHASONE SODIUM PHOSPHATE AND BETAMETHASONE ACETATE 18 MG: 3; 3 INJECTION, SUSPENSION INTRA-ARTICULAR; INTRALESIONAL; INTRAMUSCULAR at 03:06

## 2018-06-06 ENCOUNTER — TELEPHONE (OUTPATIENT)
Dept: PODIATRY | Facility: CLINIC | Age: 83
End: 2018-06-06

## 2018-06-06 NOTE — TELEPHONE ENCOUNTER
----- Message from Cyndie Fay sent at 6/5/2018  3:56 PM CDT -----  Contact: self  Patient's spouse is calling back to give doctor the name of the medication patient is using. It is Keto/cyclo/lido Lipomax 1.6 gm. Please call spouse Cam Mehta if any questions at [phone]. Thanks!

## 2018-06-08 NOTE — PROGRESS NOTES
Subjective:      Patient ID: Jodie Mehta is a 86 y.o. female.    Chief Complaint: Foot Pain  Patient presents with her  with complaint of pain in both heels, right has been severe.  Patient has dementia,  does most of the talking.  He states pain from time to time in the right heel, severe when it occurs, always at night.  Relates this weekend pain flared up in the right heel in the middle of the night, massaged and soaked her foot in hot water which seems to help, but feels bad there is nothing more he can do for her.  Patient has arthritis, degenerative disc disease.   confirms patient used to have pain first thing in the morning, but that resolved.  She wears slip-on slide like shoes all day, around the house and outdoors.      ROS     Constitutional   Constitutional: Well-developed, well-nourished, no distress    Eyes         Eyes: no dry eyes, no irritation, no vision change     ENMT   Ears: no difficulty hearing, no ear pain   Nose: no frequent nosebleeds, no nose/sinus problems   Mouth/Throat: no sore throat, no oral abnormalities/pain    Cardiovascular          Cardiovascular: no chest pain, no arm pain on exertion, no shortness of breath                  when walking, no palpitations    Respiratory           Respiratory: no cough, no wheezing, no congestion, EMPHYSEMA    Gastrointestinal   Gastrointestinal: no abdominal pain, no vomiting, normal appetite, no diarrhea    Genitourinary   Genitourinary: no incontinence, no difficulty urinating, no increased frequency     Musculoskeletal        Musculoskeletal: YES muscle aches, YES muscle weakness, YES arthralgias/joint pain,                   YES back pain, no swelling in the extremities    Integumentary   Skin: no rashes     Neurologic          Neurologic: no weakness, no numbness, no seizures    Psychiatric   Psych: no depression, no sleep disturbances    Endocrine   Endocrine: no fatigue     Allergic/Immunologic     Allergy/Immunologic: no runny nose, no sinus pressure, no itching, no hives            Objective:      Physical Exam  Vascular   Arterial Pulses Right: posterior tibialis 1/4, dorsalis pedis 1/4   Arterial Pulses Left: posterior tibialis 1/4, dorsalis pedis 1/4   No lower extremity edema bilateral today   Varicosities positive: Capillary refill test normal, varicosities present (more pronounced around the right ankle  Pedal skin temperature is mildly warm bilateral  Atrophic skin chanes     Integumentary   Skin is in good condition, thin fragile, soft, arthritic and degenerative changes with lack of fat pad.  No skin breaks, bruises, abrasions bilateral feet.  There is no edema of the heels.      Neurological   Neurological: gross sensation intact. Was not able to elicit or duplicate pain patient experiences in the middle of the night on the right heel.  There was mild discomfort plantar fascial insertion bilateral. Patient confirm this is not the pain she experiences in the middle of the night.  Symptoms and location she describes is consistent with neuritis of the right heel.    Manual Muscle Test:  Weekend bilateral lower extremity  Difficulty walking utilizing wheeled walker    Musculoskeletal   Muscle Strength and Tone Right: normal, normal tone fort age  Muscle Strength and Tone Left: normal, normal tone for age  Joints, Bones, and Muscles Right: limited ROM (Ankle joint equinus bilateral), minimal discomfort at plantar fascial insertion bilateral    Joints, Bones, and Muscles Left: limited ROM, prominence (Navicular left foot)     Radiographs:  Two views bilateral feet taken today.  There is a plantar calcaneal spur confirmed on the left foot. Prominent navicular left foot. Osteopenia.        Assessment:       Encounter Diagnoses   Name Primary?    Neuritis of right foot     Calcaneal spur of left foot     Plantar fasciitis Yes    Pain of both heels     Osteopenia of both feet          Plan:       Jodie  was seen today for foot pain.    Diagnoses and all orders for this visit:    Plantar fasciitis    Neuritis of right foot    Calcaneal spur of left foot    Pain of both heels  -     X-Ray Foot 2 View Bilateral; Future    Osteopenia of both feet    Other orders  -     betamethasone acetate-betamethasone sodium phosphate injection 18 mg; Inject 3 mLs (18 mg total) into the muscle one time.         oted we were not able to duplicate pain patient has in the middle of the night.  We did discuss mild plantar fascial pain at the bottom of the heel on both feet.  Discussed spur on the left foot.  Reviewed symptoms with , explained these types of sensations which occur at rest, usually in the middle of the night when sleeping is consistent with neuritis.  Explained it is a possibility this could be due to her back due to significant degenerative disc disease.  Advised this is an inflammatory condition, responds best to multiple treatments to decrease inflammation and appropriate shoes.  Explained  shoes patient presents in today are not appropriate.  Explained they need to have a thicker sole for shock absorption, arch support and cushion for the heel.  Advised this is specifically important due to her foot type and structure, arthritis, lack of cushion on her feet.  Discussed topical anti-inflammatory.   relates his wife has prescription count lb topical medication at home.  Advised she can use this as directed on her feet.  Advised if not effective contact the office and we can try a different prescription topical product.  Explained cool would be better for inflammation and we discussed how gradually adjust patient to this comfortably as tolerated.  Recommended IM cortisone injection.  Discussed potential side effects and length of time injection may be beneficial patient  wish to pursue injection today and administered 3 cc betamethasone IM right hip.  Advised has been to monitor  symptoms, specifically patient's activity during the day when this occurs.   related he was in understanding and agreement with treatment plan.  Counseled the patient on her conditions, their implications and medical management.  Instructed patient to contact the office with any changes, questions, concerns, worsening of symptoms. Patient/family verbalized understanding.   Total face to face time, exam, assessment, treatment, discussion, 25 minutes, more than half this time spent on consultation and coordination of care.   Follow up 2 weeks.     This note was created using Modal voice recognition software that occasionally misinterpreted phrases or words.

## 2018-10-30 ENCOUNTER — OFFICE VISIT (OUTPATIENT)
Dept: SPORTS MEDICINE | Facility: CLINIC | Age: 83
End: 2018-10-30
Payer: MEDICARE

## 2018-10-30 ENCOUNTER — HOSPITAL ENCOUNTER (OUTPATIENT)
Dept: RADIOLOGY | Facility: HOSPITAL | Age: 83
Discharge: HOME | End: 2018-10-30
Attending: FAMILY MEDICINE
Payer: MEDICARE

## 2018-10-30 VITALS — BODY MASS INDEX: 33.66 KG/M2 | TEMPERATURE: 98 F | HEIGHT: 63 IN | WEIGHT: 190 LBS

## 2018-10-30 DIAGNOSIS — M25.561 RIGHT KNEE PAIN, UNSPECIFIED CHRONICITY: ICD-10-CM

## 2018-10-30 DIAGNOSIS — M17.11 PRIMARY OSTEOARTHRITIS OF RIGHT KNEE: Primary | ICD-10-CM

## 2018-10-30 PROCEDURE — 99214 OFFICE O/P EST MOD 30 MIN: CPT | Mod: 25,S$PBB,, | Performed by: FAMILY MEDICINE

## 2018-10-30 PROCEDURE — 73564 X-RAY EXAM KNEE 4 OR MORE: CPT | Mod: 26,50,, | Performed by: RADIOLOGY

## 2018-10-30 PROCEDURE — 20611 DRAIN/INJ JOINT/BURSA W/US: CPT | Mod: PBBFAC,PO | Performed by: FAMILY MEDICINE

## 2018-10-30 PROCEDURE — 99213 OFFICE O/P EST LOW 20 MIN: CPT | Mod: PBBFAC,25,PO | Performed by: FAMILY MEDICINE

## 2018-10-30 PROCEDURE — 99999 PR PBB SHADOW E&M-EST. PATIENT-LVL III: CPT | Mod: PBBFAC,,, | Performed by: FAMILY MEDICINE

## 2018-10-30 PROCEDURE — 73564 X-RAY EXAM KNEE 4 OR MORE: CPT | Mod: TC,50,FY,PO

## 2018-10-30 RX ORDER — OXYCODONE HYDROCHLORIDE 5 MG/1
5 TABLET ORAL EVERY 4 HOURS PRN
Qty: 30 TABLET | Refills: 0 | Status: SHIPPED | OUTPATIENT
Start: 2018-10-30

## 2018-10-30 RX ORDER — TRIAMCINOLONE ACETONIDE 40 MG/ML
40 INJECTION, SUSPENSION INTRA-ARTICULAR; INTRAMUSCULAR
Status: DISCONTINUED | OUTPATIENT
Start: 2018-10-30 | End: 2018-10-30 | Stop reason: HOSPADM

## 2018-10-30 RX ADMIN — TRIAMCINOLONE ACETONIDE 40 MG: 40 INJECTION, SUSPENSION INTRA-ARTICULAR; INTRAMUSCULAR at 03:10

## 2018-10-30 NOTE — PROCEDURES
"Large Joint Aspiration/Injection: R knee  Date/Time: 10/30/2018 3:36 PM  Performed by: Kavon Villagran MD  Authorized by: Kavon Villagran MD     Consent Done?:  Yes (Verbal)  Indications:  Pain  Procedure site marked: Yes    Timeout: Prior to procedure the correct patient, procedure, and site was verified      Location:  Knee  Site:  R knee  Prep: Patient was prepped and draped in usual sterile fashion    Ultrasonic Guidance for needle placement: Yes  Images are saved and documented.  Needle size:  20 G  Approach:  Lateral  Medications:  40 mg triamcinolone acetonide 40 mg/mL  Patient tolerance:  Patient tolerated the procedure well with no immediate complications    Additional Comments: Description of ultrasound utilization for needle guidance:   Ultrasound guidance used for needle localization. Images saved and stored for documentation. The knee joint was visualized. Dynamic visualization of the 20g x 1.5" needle was continuous throughout the procedure.      "

## 2018-10-30 NOTE — PROGRESS NOTES
Jodie Mehta, a 87 y.o. female, presents today for evaluation of her right knee.      HISTORY OF PRESENT ILLNESS   Location: ant knee, right  Onset: insidious, chronic  Palliative:    Relative rest   Oral analgesics   CSI, Randy, 16.08.22, no improvement   PRP series, 17.01.02, no improvement   BMAC, 17.02.02, no improvement   COOLIEF, 18.02.23 - Dr. Delgado  Provocative:    ADLs   ambulation  Prior: none  Progression: plateau discomfort  Quality:    Sharp pain  Radiation: none  Severity: per nursing documentation  Timing: intermittent w/ use  Trauma: none    Review of systems (ROS):  A 10+ review of systems was performed with pertinent positives and negatives noted above in the history of present illness. Other systems were negative unless otherwise specified.    PHYSICAL EXAMINATION  General:  The patient is alert and oriented x 3. Mood is pleasant. Observation of ears, eyes and nose reveal no gross abnormalities. HEENT: NCAT, sclera anicteric.   Lungs: Respirations are equal and unlabored.  Gait is coordinated. Patient can toe walk and heel walk without difficulty.    right KNEE EXAMINATION    Observation/Inspection  Gait:   Nonantalgic   Alignment:  Neutral   Scars:   None   Muscle atrophy: Mild  Effusion:  None   Warmth:  None   Discoloration:   none     Tenderness / Crepitus (T / C):         T / C      T / C  Patella   - / -   Lateral joint line   - / -     Peripatellar medial  -  Medial joint line    + / -  Peripatellar lateral -  Medial plica   - / -  Patellar tendon -   Popliteal fossa   - / -  Quad tendon   -   Gastrocnemius   -  Prepatellar Bursa - / -   Quadricep   -  Tibial tubercle  -  Thigh/hamstring  -  Pes anserine/HS -  Fibula    -  ITB   - / -  Tibia     -  Tib/fib joint  - / -  LCL    -    MFC   - / -   MCL: Proximal  -    LFC   - / -   Distal    -          ROM: (* = pain)  PASSIVE   ACTIVE    Left :   5 / 0 / 145   5 / 0 / 145     Right :    5 / 0 / 145   5 / 0 /  145    Patellofemoral examination:  See above noted areas of tenderness.   Patella position    Subluxation / dislocation: Centered        Sup. / Inf;   Normal   Crepitus (PF):    Absent   Patellar Mobility:       Medial-lateral:   Normal    Superior-inferior:  Normal    Inferior tilt   Normal    Patellar tendon:  Normal   Lateral tilt:    Normal   J-sign:     None   Patellofemoral grind:   No pain       Meniscal Signs:     Pain on terminal extension:  +  Pain on terminal flexion:  +  Anas maneuver:  +*  Squat     NT    Ligament Examination:  ACL / Lachman:  WNL  PCL-Post.  drawer: normal 0 to 2mm  MCL- Valgus:  normal 0 to 2mm  LCL- Varus:    normal 0 to 2mm  Pivot shift:  guarding   Dial Test:   difference c/w other side   At 30° flexion: normal (< 5°)    At 90° flexion: normal (< 5°)   Reverse Pivot Shift:   normal (Equal)     Strength: (* = with pain) Painful Side  Quadriceps   3/5*  Hamstring:   3/5*    Extremity Neuro-vascular Examination:   Sensation:  Grossly intact to light touch all dermatomal regions.   Motor Function:  Fully intact motor function at hip, knee, foot and ankle    DTRs;  quadriceps and  achilles 2+.  No clonus and downgoing Babinski.    Vascular status:  DP and PT pulses 2+, brisk capillary refill, symmetric.     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, yoel med compartment, right  #2 s/p TKA, right    No evidence of neurologic pathology  No evidence of vascular pathology    Imaging studies reviewed:   X-ray knee, bilateral 18.10    Plan:    We discussed the importance of appropriate diet, weight, and regular exercise including quadriceps strengthening     We discussed options including:  #1 watchful waiting  #2 physical therapy aimed at:   Core stability   RoM knee   Strengthening quadriceps   Gait training   #3 injection therapy:   CSI iaknee     Right,    VSI iaknee    Right,    Orthobiologics     PRPi  iaknee     Right    SCi iaknee     Right   #4 consultation      The patient chooses #3 csi iaknee right    Pain management: handout given, #4 = oxycodone 1 tablet / day prn  Bracing: wheelchair, continue  Physical therapy:   Activity (e.g. 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

## 2021-07-01 ENCOUNTER — PATIENT MESSAGE (OUTPATIENT)
Dept: ADMINISTRATIVE | Facility: OTHER | Age: 86
End: 2021-07-01

## (undated) DEVICE — SYR DISP LL 5CC

## (undated) DEVICE — SOL IRR STRL WATER 500ML

## (undated) DEVICE — CUP MEDICINE STERILE 2OZ

## (undated) DEVICE — NDL SAFETY 25G X 1.5 ECLIPSE

## (undated) DEVICE — NDL HYPO 27G X 1 1/2

## (undated) DEVICE — SEE MEDLINE ITEM 152622

## (undated) DEVICE — LABEL FOR UTILITY MARKER

## (undated) DEVICE — SEE MEDLINE ITEM 152678

## (undated) DEVICE — PAD GROUNDING DISPER ELECTRODE

## (undated) DEVICE — SEE MEDLINE ITEM 157128

## (undated) DEVICE — SYR 10CC LUER LOCK

## (undated) DEVICE — APPLICATOR CHLORAPREP CLR 10.5

## (undated) DEVICE — GAUZE SPONGE 4X4 12PLY

## (undated) DEVICE — GLOVE SURG ULTRA TOUCH 8